# Patient Record
Sex: FEMALE | Race: WHITE
[De-identification: names, ages, dates, MRNs, and addresses within clinical notes are randomized per-mention and may not be internally consistent; named-entity substitution may affect disease eponyms.]

---

## 2017-10-25 ENCOUNTER — HOSPITAL ENCOUNTER (EMERGENCY)
Dept: HOSPITAL 17 - PHED | Age: 52
Discharge: HOME | End: 2017-10-25
Payer: SELF-PAY

## 2017-10-25 VITALS
RESPIRATION RATE: 18 BRPM | HEART RATE: 110 BPM | SYSTOLIC BLOOD PRESSURE: 163 MMHG | OXYGEN SATURATION: 90 % | DIASTOLIC BLOOD PRESSURE: 84 MMHG | TEMPERATURE: 98.5 F

## 2017-10-25 VITALS — WEIGHT: 120.81 LBS | BODY MASS INDEX: 25.36 KG/M2 | HEIGHT: 58 IN

## 2017-10-25 DIAGNOSIS — Z72.0: ICD-10-CM

## 2017-10-25 DIAGNOSIS — J44.1: Primary | ICD-10-CM

## 2017-10-25 DIAGNOSIS — R11.10: ICD-10-CM

## 2017-10-25 DIAGNOSIS — R19.7: ICD-10-CM

## 2017-10-25 LAB
ALBUMIN SERPL-MCNC: 3.6 GM/DL (ref 3.4–5)
ALP SERPL-CCNC: 124 U/L (ref 45–117)
ALT SERPL-CCNC: 123 U/L (ref 10–53)
AST SERPL-CCNC: 266 U/L (ref 15–37)
BACTERIA #/AREA URNS HPF: (no result) /HPF
BASOPHILS # BLD AUTO: 0.1 TH/MM3 (ref 0–0.2)
BASOPHILS NFR BLD: 2.2 % (ref 0–2)
BILIRUB SERPL-MCNC: 1.1 MG/DL (ref 0.2–1)
BUN SERPL-MCNC: 6 MG/DL (ref 7–18)
CALCIUM SERPL-MCNC: 8.6 MG/DL (ref 8.5–10.1)
CHLORIDE SERPL-SCNC: 97 MEQ/L (ref 98–107)
COLOR UR: YELLOW
CREAT SERPL-MCNC: 0.36 MG/DL (ref 0.5–1)
EOSINOPHIL # BLD: 0 TH/MM3 (ref 0–0.4)
EOSINOPHIL NFR BLD: 0.6 % (ref 0–4)
ERYTHROCYTE [DISTWIDTH] IN BLOOD BY AUTOMATED COUNT: 13.9 % (ref 11.6–17.2)
GFR SERPLBLD BASED ON 1.73 SQ M-ARVRAT: 189 ML/MIN (ref 89–?)
GLUCOSE SERPL-MCNC: 88 MG/DL (ref 74–106)
GLUCOSE UR STRIP-MCNC: (no result) MG/DL
HCO3 BLD-SCNC: 28.8 MEQ/L (ref 21–32)
HCT VFR BLD CALC: 42.7 % (ref 35–46)
HGB BLD-MCNC: 14.6 GM/DL (ref 11.6–15.3)
HGB UR QL STRIP: (no result)
KETONES UR STRIP-MCNC: 15 MG/DL
LEUKOCYTE ESTERASE UR QL STRIP: (no result) /HPF (ref 0–5)
LYMPHOCYTES # BLD AUTO: 1.7 TH/MM3 (ref 1–4.8)
LYMPHOCYTES NFR BLD AUTO: 27.3 % (ref 9–44)
MCH RBC QN AUTO: 34.5 PG (ref 27–34)
MCHC RBC AUTO-ENTMCNC: 34.2 % (ref 32–36)
MCV RBC AUTO: 101.1 FL (ref 80–100)
MONOCYTE #: 0.3 TH/MM3 (ref 0–0.9)
MONOCYTES NFR BLD: 4.3 % (ref 0–8)
MUCOUS THREADS #/AREA URNS LPF: (no result) /LPF
NEUTROPHILS # BLD AUTO: 4.1 TH/MM3 (ref 1.8–7.7)
NEUTROPHILS NFR BLD AUTO: 65.6 % (ref 16–70)
NITRITE UR QL STRIP: (no result)
PLATELET # BLD: 145 TH/MM3 (ref 150–450)
PMV BLD AUTO: 8.4 FL (ref 7–11)
PROT SERPL-MCNC: 6.7 GM/DL (ref 6.4–8.2)
RBC # BLD AUTO: 4.22 MIL/MM3 (ref 4–5.3)
RBC #/AREA URNS HPF: (no result) /HPF (ref 0–3)
SODIUM SERPL-SCNC: 135 MEQ/L (ref 136–145)
SP GR UR STRIP: 1.02 (ref 1–1.03)
SQUAMOUS #/AREA URNS HPF: (no result) /HPF (ref 0–5)
URINE LEUKOCYTE ESTERASE: (no result)
WBC # BLD AUTO: 6.2 TH/MM3 (ref 4–11)

## 2017-10-25 PROCEDURE — 80053 COMPREHEN METABOLIC PANEL: CPT

## 2017-10-25 PROCEDURE — 96361 HYDRATE IV INFUSION ADD-ON: CPT

## 2017-10-25 PROCEDURE — 96374 THER/PROPH/DIAG INJ IV PUSH: CPT

## 2017-10-25 PROCEDURE — 71020: CPT

## 2017-10-25 PROCEDURE — 85025 COMPLETE CBC W/AUTO DIFF WBC: CPT

## 2017-10-25 PROCEDURE — 99284 EMERGENCY DEPT VISIT MOD MDM: CPT

## 2017-10-25 PROCEDURE — 81001 URINALYSIS AUTO W/SCOPE: CPT

## 2017-10-25 PROCEDURE — 94664 DEMO&/EVAL PT USE INHALER: CPT

## 2017-10-25 NOTE — PD
Physical Exam


Date Seen by Provider:  Oct 25, 2017


Time Seen by Provider:  15:54


Narrative


The patient is a 52-year-old  female who was originally evaluated by 

the previous physician, Dr. Tobin.  Please refer to the initial history, 

physical, diagnostic evaluation, and treatment modality plan.  The patient was 

signed out at 4 PM which chest x-ray and laboratory evaluation pending for URI 

symptoms 2 weeks with productive cough and recent diarrhea.





Data


Data


Last Documented VS





Vital Signs








  Date Time  Temp Pulse Resp B/P (MAP) Pulse Ox O2 Delivery O2 Flow Rate FiO2


 


10/25/17 13:43 98.5 110 18 163/84 (110) 90   








Orders





 Orders


Urinalysis - C+S If Indicated (10/25/17 13:52)


Complete Blood Count With Diff (10/25/17 15:33)


Comprehensive Metabolic Panel (10/25/17 15:33)


Chest, Pa & Lat (10/25/17 15:33)


Albuterol-Ipratropium Neb (Duoneb Neb) (10/25/17 15:45)


Sodium Chlor 0.9% 1000 Ml Inj (Ns 1000 M (10/25/17 15:45)


Sodium Chlor 0.9% 1000 Ml Inj (Ns 1000 M (10/25/17 15:45)


Ondansetron Inj (Zofran Inj) (10/25/17 15:45)


Enteric Path (Stool) (10/25/17 15:33)


Prednisone (Deltasone) (10/25/17 17:30)





Labs





Laboratory Tests








Test


  10/25/17


14:23 10/25/17


15:48


 


Urine Collection Type CLEAN CATCH  


 


Urine Color YELLOW  


 


Urine Turbidity CLEAR  


 


Urine pH 7.5  


 


Urine Specific Gravity 1.023  


 


Urine Protein TRACE mg/dL  


 


Urine Glucose (UA) NEG mg/dL  


 


Urine Ketones 15 mg/dL  


 


Urine Occult Blood NEG  


 


Urine Nitrite NEG  


 


Urine Bilirubin NEG  


 


Urine Leukocyte Esterase NEG  


 


Urine RBC 0-3 /hpf  


 


Urine WBC 0-2 /hpf  


 


Urine Squamous Epithelial


Cells 6-8 /hpf 


  


 


 


Urine Bacteria OCC /hpf  


 


Urine Mucus MOD /lpf  


 


Microscopic Urinalysis Comment


  CULT NOT


INDICATED 


 


 


Urine Collection Time 14:23  


 


White Blood Count  6.2 TH/MM3 


 


Red Blood Count  4.22 MIL/MM3 


 


Hemoglobin  14.6 GM/DL 


 


Hematocrit  42.7 % 


 


Mean Corpuscular Volume  101.1 FL 


 


Mean Corpuscular Hemoglobin  34.5 PG 


 


Mean Corpuscular Hemoglobin


Concent 


  34.2 % 


 


 


Red Cell Distribution Width  13.9 % 


 


Platelet Count  145 TH/MM3 


 


Mean Platelet Volume  8.4 FL 


 


Neutrophils (%) (Auto)  65.6 % 


 


Lymphocytes (%) (Auto)  27.3 % 


 


Monocytes (%) (Auto)  4.3 % 


 


Eosinophils (%) (Auto)  0.6 % 


 


Basophils (%) (Auto)  2.2 % 


 


Neutrophils # (Auto)  4.1 TH/MM3 


 


Lymphocytes # (Auto)  1.7 TH/MM3 


 


Monocytes # (Auto)  0.3 TH/MM3 


 


Eosinophils # (Auto)  0.0 TH/MM3 


 


Basophils # (Auto)  0.1 TH/MM3 


 


CBC Comment  DIFF FINAL 


 


Differential Comment   


 


Blood Urea Nitrogen  6 MG/DL 


 


Creatinine  0.36 MG/DL 


 


Random Glucose  88 MG/DL 


 


Total Protein  6.7 GM/DL 


 


Albumin  3.6 GM/DL 


 


Calcium Level  8.6 MG/DL 


 


Alkaline Phosphatase  124 U/L 


 


Aspartate Amino Transf


(AST/SGOT) 


  266 U/L 


 


 


Alanine Aminotransferase


(ALT/SGPT) 


  123 U/L 


 


 


Total Bilirubin  1.1 MG/DL 


 


Sodium Level  135 MEQ/L 


 


Potassium Level  3.5 MEQ/L 


 


Chloride Level  97 MEQ/L 


 


Carbon Dioxide Level  28.8 MEQ/L 


 


Anion Gap  9 MEQ/L 


 


Estimat Glomerular Filtration


Rate 


  189 ML/MIN 


 











[facility]


Medical Record Reviewed:  Yes


Supervised Visit with SEAN:  No


Interpretation(s)





Laboratory Tests








Test


  10/25/17


14:23 10/25/17


15:48


 


Urine Collection Type CLEAN CATCH  


 


Urine Color YELLOW  


 


Urine Turbidity CLEAR  


 


Urine pH 7.5  


 


Urine Specific Gravity 1.023  


 


Urine Protein TRACE mg/dL  


 


Urine Glucose (UA) NEG mg/dL  


 


Urine Ketones 15 mg/dL  


 


Urine Occult Blood NEG  


 


Urine Nitrite NEG  


 


Urine Bilirubin NEG  


 


Urine Leukocyte Esterase NEG  


 


Urine RBC 0-3 /hpf  


 


Urine WBC 0-2 /hpf  


 


Urine Squamous Epithelial


Cells 6-8 /hpf 


  


 


 


Urine Bacteria OCC /hpf  


 


Urine Mucus MOD /lpf  


 


Microscopic Urinalysis Comment


  CULT NOT


INDICATED 


 


 


Urine Collection Time 14:23  


 


White Blood Count  6.2 TH/MM3 


 


Red Blood Count  4.22 MIL/MM3 


 


Hemoglobin  14.6 GM/DL 


 


Hematocrit  42.7 % 


 


Mean Corpuscular Volume  101.1 FL 


 


Mean Corpuscular Hemoglobin  34.5 PG 


 


Mean Corpuscular Hemoglobin


Concent 


  34.2 % 


 


 


Red Cell Distribution Width  13.9 % 


 


Platelet Count  145 TH/MM3 


 


Mean Platelet Volume  8.4 FL 


 


Neutrophils (%) (Auto)  65.6 % 


 


Lymphocytes (%) (Auto)  27.3 % 


 


Monocytes (%) (Auto)  4.3 % 


 


Eosinophils (%) (Auto)  0.6 % 


 


Basophils (%) (Auto)  2.2 % 


 


Neutrophils # (Auto)  4.1 TH/MM3 


 


Lymphocytes # (Auto)  1.7 TH/MM3 


 


Monocytes # (Auto)  0.3 TH/MM3 


 


Eosinophils # (Auto)  0.0 TH/MM3 


 


Basophils # (Auto)  0.1 TH/MM3 


 


CBC Comment  DIFF FINAL 


 


Differential Comment   


 


Blood Urea Nitrogen  6 MG/DL 


 


Creatinine  0.36 MG/DL 


 


Random Glucose  88 MG/DL 


 


Total Protein  6.7 GM/DL 


 


Albumin  3.6 GM/DL 


 


Calcium Level  8.6 MG/DL 


 


Alkaline Phosphatase  124 U/L 


 


Aspartate Amino Transf


(AST/SGOT) 


  266 U/L 


 


 


Alanine Aminotransferase


(ALT/SGPT) 


  123 U/L 


 


 


Total Bilirubin  1.1 MG/DL 


 


Sodium Level  135 MEQ/L 


 


Potassium Level  3.5 MEQ/L 


 


Chloride Level  97 MEQ/L 


 


Carbon Dioxide Level  28.8 MEQ/L 


 


Anion Gap  9 MEQ/L 


 


Estimat Glomerular Filtration


Rate 


  189 ML/MIN 


 








Differential Diagnosis


Differential diagnosis includes pneumonia, viral syndrome, influenza, colitis, 

enteritis, dehydration, COPD exacerbation.


Narrative Course


The patient was initially evaluated by the previous physician, Dr. Tobin.  

Please refer to the initial history, physical, diagnostic evaluation, and 

treatment modality plan.  The patient was signed out of 4 PM a chest x-ray and 

laboratory evaluation pending.  UA was unremarkable.  The patient's white count 

is unremarkable.  AST and ALT are elevated, AST greater than ALT.  MCV is 

slightly elevated at 101.  Chest x-rays unremarkable.  I had a discussion with 

the patient at 5:10 PM she was reevaluated, her symptoms had significantly 

improved.  O2 sat was 94% on room air.  The patient appears to have bronchitis 

and underlying viral syndrome.  The patient is taking over-the-counter Imodium 

and drinking plenty of fluids without any vomiting.  She'll be sent home on 

prednisone, albuterol nebulizers, and Zithromax.  She is advised to follow-up 

with her primary physician, Dr. Greenberg, and will be provided a copy of her x-

ray results and lab results at discharge.  She is advised to return if symptoms 

worsen or progress.


Diagnosis





 Primary Impression:  


 COPD with acute exacerbation


 Additional Impression:  


 Diarrhea


 Qualified Codes:  R19.7 - Diarrhea, unspecified


Patient Instructions:  General Instructions





***Additional Instruction:  


Medications as directed.  Return if symptoms worsen or progress.  Please 

provide the patient a copy of her x-ray results and lab results at discharge.  

Follow-up with Dr. Greenberg.


***Med/Other Pt SpecificInfo:  Prescription(s) given


Scripts


Albuterol Neb (Albuterol Neb) 2.5 Mg/3 Ml Neb


2.5 MG NEB Q4HR NEB Y for SHORTNESS OF BREATH, #60 NEBULE 0 Refills


   Prov: Kirill Dillard MD         10/25/17 


Azithromycin (Zithromax Z-Angelito) 250 Mg Dspk


250 MG PO AS DIRECTED for Infection, #1 DSPK 0 Refills


   500 MG (2 tabs) day 1, then 1 tab days 2-5.


   Prov: Kirill Dillard MD         10/25/17 


Prednisone (Deltasone) 20 Mg Tab


40 MG PO DAILY for 4 Days, #8 TAB 0 Refills


   Prov: Kirill Dillard MD         10/25/17


Disposition:  01 DISCHARGE HOME


Condition:  Stable











Kirill Dillard MD Oct 25, 2017 15:55

## 2017-10-25 NOTE — PD
HPI


Chief Complaint:  General Weakness


Time Seen by Provider:  15:17


Travel History


International Travel<30 days:  No


Contact w/Intl Traveler<30days:  No


Traveled to known affect area:  No





History of Present Illness


HPI


This 52-year-old female is complaining of cough and congestion.  She's been 

sick for a couple of weeks.  The last few days been having a lot of vomiting 

and diarrhea.  She started with cough and congestion.  She was here in 2016 with pneumonia.  She does smoke cigarettes.  She's been coughing up a lot 

of phlegm.  She started with diarrhea the last few days.  She estimates she has 

had a episodes of diarrhea today.  She has not been on any antibiotics.  She 

has been using over-the-counter medications without any response.  She says she 

feels like she did when she had pneumonia





PFSH


Past Medical History


Autoimmune Disease:  No


Cancer:  No


Cardiovascular Problems:  Yes


High Cholesterol:  Yes


Chemotherapy:  No


Diabetes:  No


Diminished Hearing:  No


Endocrine:  No


Gastrointestinal Disorders:  Yes


GERD:  No


Genitourinary:  No


Hiatal Hernia:  No


Immune Disorder:  No


Musculoskeletal:  No


Neurologic:  No


Psychiatric:  No


Reproductive:  No


Respiratory:  Yes


Radiation Therapy:  No


Sickle Cell Disease:  No


Thyroid Disease:  No


Pregnant?:  Not Pregnant


Menopausal:  Yes


:  2


Para:  2





Past Surgical History


Abdominal Surgery:  Yes (Hernia repair)


Cardiac Surgery:  No


 Section:  Yes (X's 2)


Cholecystectomy:  Yes


Ear Surgery:  No


Endocrine Surgery:  No


Eye Surgery:  No


Genitourinary Surgery:  No


Gynecologic Surgery:  Yes (2 c-sections)


Neurologic Surgery:  Yes (Sinus)


Oral Surgery:  No


Pacemaker:  No


Thoracic Surgery:  No


Tonsillectomy:  Yes


Other Surgery:  Yes





Social History


Alcohol Use:  Yes (Soc.)


Tobacco Use:  Yes (1 PPD)


Substance Use:  No





Allergies-Medications


(Allergen,Severity, Reaction):  


Coded Allergies:  


     codeine (Unverified  Allergy, Severe, INCREASED LIVER ENZYMES, 10/25/17)


     meperidine (Unverified  Allergy, Severe, UNKNOWN, 10/25/17)


     acetaminophen (Unverified  Adverse Reaction, Severe, LIVER PROBLEMS, 10/25/

17)


     morphine (Unverified  Adverse Reaction, Severe, LIVER PROBLEMS, 10/25/17)


Reported Meds & Prescriptions





Reported Meds & Active Scripts


Active


Reported


Ambien (Zolpidem Tartrate) 5 Mg Tab 5 Mg PO HS PRN








Review of Systems


General / Constitutional:  Positive: Fever, Chills


Eyes:  No: Diploplia, Blurred Vision


HENT:  No: Headaches, Vertigo


Cardiovascular:  No: Chest Pain or Discomfort, Palpitations


Respiratory:  No: Cough, Shortness of Breath


Gastrointestinal:  No: Nausea, Vomiting


Genitourinary:  No: Urgency, Frequency


Musculoskeletal:  No: Myalgias, Arthralgias


Skin:  No Rash, No Itching


Neurologic:  No: Weakness, Dizziness


Endocrine:  No: Heat Intolerance, Cold Intolerance


Hematologic/Lymphatic:  No: Easy Bruising





Physical Exam


Narrative


GENERAL: Well-developed female


SKIN: Focused skin assessment warm/dry.


HEAD: Atraumatic. Normocephalic. 


EYES: Pupils equal and round. No scleral icterus. No injection or drainage. 


ENT: No nasal bleeding or discharge.  Mucous membranes pink and moist.


NECK: Trachea midline. No JVD. 


CARDIOVASCULAR: Regular rate and rhythm.  No murmur appreciated.


RESPIRATORY: No accessory muscle use. Clear to auscultation. Breath sounds 

equal bilaterally. 


GASTROINTESTINAL: Abdomen soft, non-tender, nondistended. Hepatic and splenic 

margins not palpable. 


MUSCULOSKELETAL: No obvious deformities. No clubbing.  No cyanosis.  No edema. 


NEUROLOGICAL: Awake and alert. No obvious cranial nerve deficits.  Motor 

grossly within normal limits. Normal speech.


PSYCHIATRIC: Appropriate mood and affect; insight and judgment normal.Physical





Data


Data


Last Documented VS





Vital Signs








  Date Time  Temp Pulse Resp B/P (MAP) Pulse Ox O2 Delivery O2 Flow Rate FiO2


 


10/25/17 13:43 98.5 110 18 163/84 (110) 90   








Orders





 Orders


Urinalysis - C+S If Indicated (10/25/17 13:52)


Complete Blood Count With Diff (10/25/17 15:33)


Comprehensive Metabolic Panel (10/25/17 15:33)


Chest, Pa & Lat (10/25/17 15:33)


Albuterol-Ipratropium Neb (Duoneb Neb) (10/25/17 15:45)


Sodium Chlor 0.9% 1000 Ml Inj (Ns 1000 M (10/25/17 15:45)


Sodium Chlor 0.9% 1000 Ml Inj (Ns 1000 M (10/25/17 15:45)


Ondansetron Inj (Zofran Inj) (10/25/17 15:45)


Enteric Path (Stool) (10/25/17 15:33)





Labs





Laboratory Tests








Test


  10/25/17


14:23 10/25/17


15:48


 


Urine Collection Type CLEAN CATCH  


 


Urine Color YELLOW  


 


Urine Turbidity CLEAR  


 


Urine pH 7.5  


 


Urine Specific Gravity 1.023  


 


Urine Protein TRACE mg/dL  


 


Urine Glucose (UA) NEG mg/dL  


 


Urine Ketones 15 mg/dL  


 


Urine Occult Blood NEG  


 


Urine Nitrite NEG  


 


Urine Bilirubin NEG  


 


Urine Leukocyte Esterase NEG  


 


Urine RBC 0-3 /hpf  


 


Urine WBC 0-2 /hpf  


 


Urine Squamous Epithelial


Cells 6-8 /hpf 


  


 


 


Urine Bacteria OCC /hpf  


 


Urine Mucus MOD /lpf  


 


Microscopic Urinalysis Comment


  CULT NOT


INDICATED 


 


 


Urine Collection Time 14:23  


 


White Blood Count  6.2 TH/MM3 


 


Red Blood Count  4.22 MIL/MM3 


 


Hemoglobin  14.6 GM/DL 


 


Hematocrit  42.7 % 


 


Mean Corpuscular Volume  101.1 FL 


 


Mean Corpuscular Hemoglobin  34.5 PG 


 


Mean Corpuscular Hemoglobin


Concent 


  34.2 % 


 


 


Red Cell Distribution Width  13.9 % 


 


Platelet Count  145 TH/MM3 


 


Mean Platelet Volume  8.4 FL 


 


Neutrophils (%) (Auto)  65.6 % 


 


Lymphocytes (%) (Auto)  27.3 % 


 


Monocytes (%) (Auto)  4.3 % 


 


Eosinophils (%) (Auto)  0.6 % 


 


Basophils (%) (Auto)  2.2 % 


 


Neutrophils # (Auto)  4.1 TH/MM3 


 


Lymphocytes # (Auto)  1.7 TH/MM3 


 


Monocytes # (Auto)  0.3 TH/MM3 


 


Eosinophils # (Auto)  0.0 TH/MM3 


 


Basophils # (Auto)  0.1 TH/MM3 


 


CBC Comment  DIFF FINAL 


 


Differential Comment   


 


Sodium Level  135 MEQ/L 


 


Potassium Level  3.5 MEQ/L 


 


Chloride Level  97 MEQ/L 











[facility]


Medical Decision Making


Medical Screen Exam Complete:  Yes


Emergency Medical Condition:  Yes


Medical Record Reviewed:  Yes


Differential Diagnosis


Differential includes pneumonia, COPD exacerbation, bronchitis, gastroenteritis


Narrative Course


Lab work and x-ray have been ordered and will be evaluated by oncoming physician











Tigre Gallagher MD Oct 25, 2017 15:44

## 2017-10-25 NOTE — PD
HPI


Chief Complaint:  General Weakness


Time Seen by Provider:  15:17


Travel History


International Travel<30 days:  No


Contact w/Intl Traveler<30days:  No


Traveled to known affect area:  No





History of Present Illness


HPI


This 52-year-old female is complaining of cough and congestion.  She's been 

sick for a couple of weeks.  The last few days been having a lot of vomiting 

and diarrhea.  She started with cough and congestion.  She was here in 2016 with pneumonia.  She does smoke cigarettes.  She's been coughing up a lot 

of phlegm.  She started with diarrhea the last few days.  She estimates she has 

had a episodes of diarrhea today.  She has not been on any antibiotics.  She 

has been using over-the-counter medications without any response.  She says she 

feels like she did when she had pneumonia





PFSH


Past Medical History


Autoimmune Disease:  No


Cancer:  No


Cardiovascular Problems:  Yes


High Cholesterol:  Yes


Chemotherapy:  No


Diabetes:  No


Diminished Hearing:  No


Endocrine:  No


Gastrointestinal Disorders:  Yes


GERD:  No


Genitourinary:  No


Hiatal Hernia:  No


Immune Disorder:  No


Musculoskeletal:  No


Neurologic:  No


Psychiatric:  No


Reproductive:  No


Respiratory:  Yes


Radiation Therapy:  No


Sickle Cell Disease:  No


Thyroid Disease:  No


Pregnant?:  Not Pregnant


Menopausal:  Yes


:  2


Para:  2





Past Surgical History


Abdominal Surgery:  Yes (Hernia repair)


Cardiac Surgery:  No


 Section:  Yes (X's 2)


Cholecystectomy:  Yes


Ear Surgery:  No


Endocrine Surgery:  No


Eye Surgery:  No


Genitourinary Surgery:  No


Gynecologic Surgery:  Yes (2 c-sections)


Neurologic Surgery:  Yes (Sinus)


Oral Surgery:  No


Pacemaker:  No


Thoracic Surgery:  No


Tonsillectomy:  Yes


Other Surgery:  Yes





Social History


Alcohol Use:  Yes (Soc.)


Tobacco Use:  Yes (1 PPD)


Substance Use:  No





Allergies-Medications


(Allergen,Severity, Reaction):  


Coded Allergies:  


     codeine (Unverified  Allergy, Severe, INCREASED LIVER ENZYMES, 10/25/17)


     meperidine (Unverified  Allergy, Severe, UNKNOWN, 10/25/17)


     acetaminophen (Unverified  Adverse Reaction, Severe, LIVER PROBLEMS, 10/25/

17)


     morphine (Unverified  Adverse Reaction, Severe, LIVER PROBLEMS, 10/25/17)


Reported Meds & Prescriptions





Reported Meds & Active Scripts


Active


Reported


Ambien (Zolpidem Tartrate) 5 Mg Tab 5 Mg PO HS PRN








Review of Systems


General / Constitutional:  Positive: Fever, Chills


Eyes:  No: Diploplia, Blurred Vision


HENT:  No: Headaches, Vertigo


Cardiovascular:  No: Chest Pain or Discomfort, Palpitations


Respiratory:  No: Cough, Shortness of Breath


Gastrointestinal:  No: Nausea, Vomiting


Genitourinary:  No: Urgency, Frequency


Musculoskeletal:  No: Myalgias, Arthralgias


Skin:  No Rash, No Itching


Neurologic:  No: Weakness, Dizziness


Endocrine:  No: Heat Intolerance, Cold Intolerance


Hematologic/Lymphatic:  No: Easy Bruising





Physical Exam


Narrative


GENERAL: Well-developed female


SKIN: Focused skin assessment warm/dry.


HEAD: Atraumatic. Normocephalic. 


EYES: Pupils equal and round. No scleral icterus. No injection or drainage. 


ENT: No nasal bleeding or discharge.  Mucous membranes pink and moist.


NECK: Trachea midline. No JVD. 


CARDIOVASCULAR: Regular rate and rhythm.  No murmur appreciated.


RESPIRATORY: No accessory muscle use. Clear to auscultation. Breath sounds 

equal bilaterally. 


GASTROINTESTINAL: Abdomen soft, non-tender, nondistended. Hepatic and splenic 

margins not palpable. 


MUSCULOSKELETAL: No obvious deformities. No clubbing.  No cyanosis.  No edema. 


NEUROLOGICAL: Awake and alert. No obvious cranial nerve deficits.  Motor 

grossly within normal limits. Normal speech.


PSYCHIATRIC: Appropriate mood and affect; insight and judgment normal.Physical





Data


Data


Last Documented VS





Vital Signs








  Date Time  Temp Pulse Resp B/P (MAP) Pulse Ox O2 Delivery O2 Flow Rate FiO2


 


10/25/17 13:43 98.5 110 18 163/84 (110) 90   








Orders





 Orders


Urinalysis - C+S If Indicated (10/25/17 13:52)


Complete Blood Count With Diff (10/25/17 15:33)


Comprehensive Metabolic Panel (10/25/17 15:33)


Chest, Pa & Lat (10/25/17 15:33)


Albuterol-Ipratropium Neb (Duoneb Neb) (10/25/17 15:45)


Sodium Chlor 0.9% 1000 Ml Inj (Ns 1000 M (10/25/17 15:45)


Sodium Chlor 0.9% 1000 Ml Inj (Ns 1000 M (10/25/17 15:45)


Ondansetron Inj (Zofran Inj) (10/25/17 15:45)


Enteric Path (Stool) (10/25/17 15:33)





Labs





Laboratory Tests








Test


  10/25/17


14:23 10/25/17


15:48


 


Urine Collection Type CLEAN CATCH  


 


Urine Color YELLOW  


 


Urine Turbidity CLEAR  


 


Urine pH 7.5  


 


Urine Specific Gravity 1.023  


 


Urine Protein TRACE mg/dL  


 


Urine Glucose (UA) NEG mg/dL  


 


Urine Ketones 15 mg/dL  


 


Urine Occult Blood NEG  


 


Urine Nitrite NEG  


 


Urine Bilirubin NEG  


 


Urine Leukocyte Esterase NEG  


 


Urine RBC 0-3 /hpf  


 


Urine WBC 0-2 /hpf  


 


Urine Squamous Epithelial


Cells 6-8 /hpf 


  


 


 


Urine Bacteria OCC /hpf  


 


Urine Mucus MOD /lpf  


 


Microscopic Urinalysis Comment


  CULT NOT


INDICATED 


 


 


Urine Collection Time 14:23  


 


White Blood Count  6.2 TH/MM3 


 


Red Blood Count  4.22 MIL/MM3 


 


Hemoglobin  14.6 GM/DL 


 


Hematocrit  42.7 % 


 


Mean Corpuscular Volume  101.1 FL 


 


Mean Corpuscular Hemoglobin  34.5 PG 


 


Mean Corpuscular Hemoglobin


Concent 


  34.2 % 


 


 


Red Cell Distribution Width  13.9 % 


 


Platelet Count  145 TH/MM3 


 


Mean Platelet Volume  8.4 FL 


 


Neutrophils (%) (Auto)  65.6 % 


 


Lymphocytes (%) (Auto)  27.3 % 


 


Monocytes (%) (Auto)  4.3 % 


 


Eosinophils (%) (Auto)  0.6 % 


 


Basophils (%) (Auto)  2.2 % 


 


Neutrophils # (Auto)  4.1 TH/MM3 


 


Lymphocytes # (Auto)  1.7 TH/MM3 


 


Monocytes # (Auto)  0.3 TH/MM3 


 


Eosinophils # (Auto)  0.0 TH/MM3 


 


Basophils # (Auto)  0.1 TH/MM3 


 


CBC Comment  DIFF FINAL 


 


Differential Comment   


 


Sodium Level  135 MEQ/L 


 


Potassium Level  3.5 MEQ/L 


 


Chloride Level  97 MEQ/L 











[facility]


Medical Decision Making


Medical Screen Exam Complete:  Yes


Emergency Medical Condition:  Yes


Medical Record Reviewed:  Yes


Differential Diagnosis


Differential includes pneumonia, COPD exacerbation, bronchitis, gastroenteritis


Narrative Course


Lab work and x-ray have been ordered and will be evaluated by oncoming physician











Tigre Gallagher MD Oct 25, 2017 15:44

## 2017-10-25 NOTE — PD
Physical Exam


Date Seen by Provider:  Oct 25, 2017


Time Seen by Provider:  15:54


Narrative


The patient is a 52-year-old  female who was originally evaluated by 

the previous physician, Dr. Tobin.  Please refer to the initial history, 

physical, diagnostic evaluation, and treatment modality plan.  The patient was 

signed out at 4 PM which chest x-ray and laboratory evaluation pending for URI 

symptoms 2 weeks with productive cough and recent diarrhea.





Data


Data


Last Documented VS





Vital Signs








  Date Time  Temp Pulse Resp B/P (MAP) Pulse Ox O2 Delivery O2 Flow Rate FiO2


 


10/25/17 13:43 98.5 110 18 163/84 (110) 90   








Orders





 Orders


Urinalysis - C+S If Indicated (10/25/17 13:52)


Complete Blood Count With Diff (10/25/17 15:33)


Comprehensive Metabolic Panel (10/25/17 15:33)


Chest, Pa & Lat (10/25/17 15:33)


Albuterol-Ipratropium Neb (Duoneb Neb) (10/25/17 15:45)


Sodium Chlor 0.9% 1000 Ml Inj (Ns 1000 M (10/25/17 15:45)


Sodium Chlor 0.9% 1000 Ml Inj (Ns 1000 M (10/25/17 15:45)


Ondansetron Inj (Zofran Inj) (10/25/17 15:45)


Enteric Path (Stool) (10/25/17 15:33)


Prednisone (Deltasone) (10/25/17 17:30)





Labs





Laboratory Tests








Test


  10/25/17


14:23 10/25/17


15:48


 


Urine Collection Type CLEAN CATCH  


 


Urine Color YELLOW  


 


Urine Turbidity CLEAR  


 


Urine pH 7.5  


 


Urine Specific Gravity 1.023  


 


Urine Protein TRACE mg/dL  


 


Urine Glucose (UA) NEG mg/dL  


 


Urine Ketones 15 mg/dL  


 


Urine Occult Blood NEG  


 


Urine Nitrite NEG  


 


Urine Bilirubin NEG  


 


Urine Leukocyte Esterase NEG  


 


Urine RBC 0-3 /hpf  


 


Urine WBC 0-2 /hpf  


 


Urine Squamous Epithelial


Cells 6-8 /hpf 


  


 


 


Urine Bacteria OCC /hpf  


 


Urine Mucus MOD /lpf  


 


Microscopic Urinalysis Comment


  CULT NOT


INDICATED 


 


 


Urine Collection Time 14:23  


 


White Blood Count  6.2 TH/MM3 


 


Red Blood Count  4.22 MIL/MM3 


 


Hemoglobin  14.6 GM/DL 


 


Hematocrit  42.7 % 


 


Mean Corpuscular Volume  101.1 FL 


 


Mean Corpuscular Hemoglobin  34.5 PG 


 


Mean Corpuscular Hemoglobin


Concent 


  34.2 % 


 


 


Red Cell Distribution Width  13.9 % 


 


Platelet Count  145 TH/MM3 


 


Mean Platelet Volume  8.4 FL 


 


Neutrophils (%) (Auto)  65.6 % 


 


Lymphocytes (%) (Auto)  27.3 % 


 


Monocytes (%) (Auto)  4.3 % 


 


Eosinophils (%) (Auto)  0.6 % 


 


Basophils (%) (Auto)  2.2 % 


 


Neutrophils # (Auto)  4.1 TH/MM3 


 


Lymphocytes # (Auto)  1.7 TH/MM3 


 


Monocytes # (Auto)  0.3 TH/MM3 


 


Eosinophils # (Auto)  0.0 TH/MM3 


 


Basophils # (Auto)  0.1 TH/MM3 


 


CBC Comment  DIFF FINAL 


 


Differential Comment   


 


Blood Urea Nitrogen  6 MG/DL 


 


Creatinine  0.36 MG/DL 


 


Random Glucose  88 MG/DL 


 


Total Protein  6.7 GM/DL 


 


Albumin  3.6 GM/DL 


 


Calcium Level  8.6 MG/DL 


 


Alkaline Phosphatase  124 U/L 


 


Aspartate Amino Transf


(AST/SGOT) 


  266 U/L 


 


 


Alanine Aminotransferase


(ALT/SGPT) 


  123 U/L 


 


 


Total Bilirubin  1.1 MG/DL 


 


Sodium Level  135 MEQ/L 


 


Potassium Level  3.5 MEQ/L 


 


Chloride Level  97 MEQ/L 


 


Carbon Dioxide Level  28.8 MEQ/L 


 


Anion Gap  9 MEQ/L 


 


Estimat Glomerular Filtration


Rate 


  189 ML/MIN 


 











[facility]


Medical Record Reviewed:  Yes


Supervised Visit with SEAN:  No


Interpretation(s)





Laboratory Tests








Test


  10/25/17


14:23 10/25/17


15:48


 


Urine Collection Type CLEAN CATCH  


 


Urine Color YELLOW  


 


Urine Turbidity CLEAR  


 


Urine pH 7.5  


 


Urine Specific Gravity 1.023  


 


Urine Protein TRACE mg/dL  


 


Urine Glucose (UA) NEG mg/dL  


 


Urine Ketones 15 mg/dL  


 


Urine Occult Blood NEG  


 


Urine Nitrite NEG  


 


Urine Bilirubin NEG  


 


Urine Leukocyte Esterase NEG  


 


Urine RBC 0-3 /hpf  


 


Urine WBC 0-2 /hpf  


 


Urine Squamous Epithelial


Cells 6-8 /hpf 


  


 


 


Urine Bacteria OCC /hpf  


 


Urine Mucus MOD /lpf  


 


Microscopic Urinalysis Comment


  CULT NOT


INDICATED 


 


 


Urine Collection Time 14:23  


 


White Blood Count  6.2 TH/MM3 


 


Red Blood Count  4.22 MIL/MM3 


 


Hemoglobin  14.6 GM/DL 


 


Hematocrit  42.7 % 


 


Mean Corpuscular Volume  101.1 FL 


 


Mean Corpuscular Hemoglobin  34.5 PG 


 


Mean Corpuscular Hemoglobin


Concent 


  34.2 % 


 


 


Red Cell Distribution Width  13.9 % 


 


Platelet Count  145 TH/MM3 


 


Mean Platelet Volume  8.4 FL 


 


Neutrophils (%) (Auto)  65.6 % 


 


Lymphocytes (%) (Auto)  27.3 % 


 


Monocytes (%) (Auto)  4.3 % 


 


Eosinophils (%) (Auto)  0.6 % 


 


Basophils (%) (Auto)  2.2 % 


 


Neutrophils # (Auto)  4.1 TH/MM3 


 


Lymphocytes # (Auto)  1.7 TH/MM3 


 


Monocytes # (Auto)  0.3 TH/MM3 


 


Eosinophils # (Auto)  0.0 TH/MM3 


 


Basophils # (Auto)  0.1 TH/MM3 


 


CBC Comment  DIFF FINAL 


 


Differential Comment   


 


Blood Urea Nitrogen  6 MG/DL 


 


Creatinine  0.36 MG/DL 


 


Random Glucose  88 MG/DL 


 


Total Protein  6.7 GM/DL 


 


Albumin  3.6 GM/DL 


 


Calcium Level  8.6 MG/DL 


 


Alkaline Phosphatase  124 U/L 


 


Aspartate Amino Transf


(AST/SGOT) 


  266 U/L 


 


 


Alanine Aminotransferase


(ALT/SGPT) 


  123 U/L 


 


 


Total Bilirubin  1.1 MG/DL 


 


Sodium Level  135 MEQ/L 


 


Potassium Level  3.5 MEQ/L 


 


Chloride Level  97 MEQ/L 


 


Carbon Dioxide Level  28.8 MEQ/L 


 


Anion Gap  9 MEQ/L 


 


Estimat Glomerular Filtration


Rate 


  189 ML/MIN 


 








Differential Diagnosis


Differential diagnosis includes pneumonia, viral syndrome, influenza, colitis, 

enteritis, dehydration, COPD exacerbation.


Narrative Course


The patient was initially evaluated by the previous physician, Dr. Tobin.  

Please refer to the initial history, physical, diagnostic evaluation, and 

treatment modality plan.  The patient was signed out of 4 PM a chest x-ray and 

laboratory evaluation pending.  UA was unremarkable.  The patient's white count 

is unremarkable.  AST and ALT are elevated, AST greater than ALT.  MCV is 

slightly elevated at 101.  Chest x-rays unremarkable.  I had a discussion with 

the patient at 5:10 PM she was reevaluated, her symptoms had significantly 

improved.  O2 sat was 94% on room air.  The patient appears to have bronchitis 

and underlying viral syndrome.  The patient is taking over-the-counter Imodium 

and drinking plenty of fluids without any vomiting.  She'll be sent home on 

prednisone, albuterol nebulizers, and Zithromax.  She is advised to follow-up 

with her primary physician, Dr. Greenberg, and will be provided a copy of her x-

ray results and lab results at discharge.  She is advised to return if symptoms 

worsen or progress.


Diagnosis





 Primary Impression:  


 COPD with acute exacerbation


 Additional Impression:  


 Diarrhea


 Qualified Codes:  R19.7 - Diarrhea, unspecified


Patient Instructions:  General Instructions





***Additional Instruction:  


Medications as directed.  Return if symptoms worsen or progress.  Please 

provide the patient a copy of her x-ray results and lab results at discharge.  

Follow-up with Dr. Greenberg.


***Med/Other Pt SpecificInfo:  Prescription(s) given


Scripts


Albuterol Neb (Albuterol Neb) 2.5 Mg/3 Ml Neb


2.5 MG NEB Q4HR NEB Y for SHORTNESS OF BREATH, #60 NEBULE 0 Refills


   Prov: Kirill Dillard MD         10/25/17 


Azithromycin (Zithromax Z-Angelito) 250 Mg Dspk


250 MG PO AS DIRECTED for Infection, #1 DSPK 0 Refills


   500 MG (2 tabs) day 1, then 1 tab days 2-5.


   Prov: Kirill Dillard MD         10/25/17 


Prednisone (Deltasone) 20 Mg Tab


40 MG PO DAILY for 4 Days, #8 TAB 0 Refills


   Prov: Kirill Dillard MD         10/25/17


Disposition:  01 DISCHARGE HOME


Condition:  Stable











Kirill Dillard MD Oct 25, 2017 15:55

## 2017-10-25 NOTE — RADRPT
EXAM DATE/TIME:  10/25/2017 15:46 

 

[facility] COMPARISON:     

No previous studies available for comparison.

 

                     

INDICATIONS :     

Short of breath and cough.

                     

 

MEDICAL HISTORY :     

None.          

 

SURGICAL HISTORY :     

None.   

 

ENCOUNTER:     

Initial                                        

 

ACUITY:     

2 weeks      

 

PAIN SCORE:     

6/10

 

LOCATION:     

Bilateral chest 

 

FINDINGS:     

PA and lateral views of the chest demonstrate the lungs to be symmetrically aerated without evidence 
of mass, infiltrate or effusion.  The cardiomediastinal contours are unremarkable.  Mild S-shaped sco
liosis.  Hemoclips in the right upper quadrant..

 

CONCLUSION:     

No acute cardiopulmonary disease.

 

 

 

 Edmundo Nielsen MD on October 25, 2017 at 16:19           

Board Certified Radiologist.

 This report was verified electronically.

## 2017-10-25 NOTE — PD
HPI


Chief Complaint:  General Weakness


Time Seen by Provider:  15:17


Travel History


International Travel<30 days:  No


Contact w/Intl Traveler<30days:  No


Traveled to known affect area:  No





History of Present Illness


HPI


This 52-year-old female is complaining of cough and congestion.  She's been 

sick for a couple of weeks.  The last few days been having a lot of vomiting 

and diarrhea.  She started with cough and congestion.  She was here in 2016 with pneumonia.  She does smoke cigarettes.  She's been coughing up a lot 

of phlegm.  She started with diarrhea the last few days.  She estimates she has 

had a episodes of diarrhea today.  She has not been on any antibiotics.  She 

has been using over-the-counter medications without any response.  She says she 

feels like she did when she had pneumonia





PFSH


Past Medical History


Autoimmune Disease:  No


Cancer:  No


Cardiovascular Problems:  Yes


High Cholesterol:  Yes


Chemotherapy:  No


Diabetes:  No


Diminished Hearing:  No


Endocrine:  No


Gastrointestinal Disorders:  Yes


GERD:  No


Genitourinary:  No


Hiatal Hernia:  No


Immune Disorder:  No


Musculoskeletal:  No


Neurologic:  No


Psychiatric:  No


Reproductive:  No


Respiratory:  Yes


Radiation Therapy:  No


Sickle Cell Disease:  No


Thyroid Disease:  No


Pregnant?:  Not Pregnant


Menopausal:  Yes


:  2


Para:  2





Past Surgical History


Abdominal Surgery:  Yes (Hernia repair)


Cardiac Surgery:  No


 Section:  Yes (X's 2)


Cholecystectomy:  Yes


Ear Surgery:  No


Endocrine Surgery:  No


Eye Surgery:  No


Genitourinary Surgery:  No


Gynecologic Surgery:  Yes (2 c-sections)


Neurologic Surgery:  Yes (Sinus)


Oral Surgery:  No


Pacemaker:  No


Thoracic Surgery:  No


Tonsillectomy:  Yes


Other Surgery:  Yes





Social History


Alcohol Use:  Yes (Soc.)


Tobacco Use:  Yes (1 PPD)


Substance Use:  No





Allergies-Medications


(Allergen,Severity, Reaction):  


Coded Allergies:  


     codeine (Unverified  Allergy, Severe, INCREASED LIVER ENZYMES, 10/25/17)


     meperidine (Unverified  Allergy, Severe, UNKNOWN, 10/25/17)


     acetaminophen (Unverified  Adverse Reaction, Severe, LIVER PROBLEMS, 10/25/

17)


     morphine (Unverified  Adverse Reaction, Severe, LIVER PROBLEMS, 10/25/17)


Reported Meds & Prescriptions





Reported Meds & Active Scripts


Active


Reported


Ambien (Zolpidem Tartrate) 5 Mg Tab 5 Mg PO HS PRN








Review of Systems


General / Constitutional:  Positive: Fever, Chills


Eyes:  No: Diploplia, Blurred Vision


HENT:  No: Headaches, Vertigo


Cardiovascular:  No: Chest Pain or Discomfort, Palpitations


Respiratory:  No: Cough, Shortness of Breath


Gastrointestinal:  No: Nausea, Vomiting


Genitourinary:  No: Urgency, Frequency


Musculoskeletal:  No: Myalgias, Arthralgias


Skin:  No Rash, No Itching


Neurologic:  No: Weakness, Dizziness


Endocrine:  No: Heat Intolerance, Cold Intolerance


Hematologic/Lymphatic:  No: Easy Bruising





Physical Exam


Narrative


GENERAL: Well-developed female


SKIN: Focused skin assessment warm/dry.


HEAD: Atraumatic. Normocephalic. 


EYES: Pupils equal and round. No scleral icterus. No injection or drainage. 


ENT: No nasal bleeding or discharge.  Mucous membranes pink and moist.


NECK: Trachea midline. No JVD. 


CARDIOVASCULAR: Regular rate and rhythm.  No murmur appreciated.


RESPIRATORY: No accessory muscle use. Clear to auscultation. Breath sounds 

equal bilaterally. 


GASTROINTESTINAL: Abdomen soft, non-tender, nondistended. Hepatic and splenic 

margins not palpable. 


MUSCULOSKELETAL: No obvious deformities. No clubbing.  No cyanosis.  No edema. 


NEUROLOGICAL: Awake and alert. No obvious cranial nerve deficits.  Motor 

grossly within normal limits. Normal speech.


PSYCHIATRIC: Appropriate mood and affect; insight and judgment normal.Physical





Data


Data


Last Documented VS





Vital Signs








  Date Time  Temp Pulse Resp B/P (MAP) Pulse Ox O2 Delivery O2 Flow Rate FiO2


 


10/25/17 13:43 98.5 110 18 163/84 (110) 90   








Orders





 Orders


Urinalysis - C+S If Indicated (10/25/17 13:52)


Complete Blood Count With Diff (10/25/17 15:33)


Comprehensive Metabolic Panel (10/25/17 15:33)


Chest, Pa & Lat (10/25/17 15:33)


Albuterol-Ipratropium Neb (Duoneb Neb) (10/25/17 15:45)


Sodium Chlor 0.9% 1000 Ml Inj (Ns 1000 M (10/25/17 15:45)


Sodium Chlor 0.9% 1000 Ml Inj (Ns 1000 M (10/25/17 15:45)


Ondansetron Inj (Zofran Inj) (10/25/17 15:45)


Enteric Path (Stool) (10/25/17 15:33)





Labs





Laboratory Tests








Test


  10/25/17


14:23 10/25/17


15:48


 


Urine Collection Type CLEAN CATCH  


 


Urine Color YELLOW  


 


Urine Turbidity CLEAR  


 


Urine pH 7.5  


 


Urine Specific Gravity 1.023  


 


Urine Protein TRACE mg/dL  


 


Urine Glucose (UA) NEG mg/dL  


 


Urine Ketones 15 mg/dL  


 


Urine Occult Blood NEG  


 


Urine Nitrite NEG  


 


Urine Bilirubin NEG  


 


Urine Leukocyte Esterase NEG  


 


Urine RBC 0-3 /hpf  


 


Urine WBC 0-2 /hpf  


 


Urine Squamous Epithelial


Cells 6-8 /hpf 


  


 


 


Urine Bacteria OCC /hpf  


 


Urine Mucus MOD /lpf  


 


Microscopic Urinalysis Comment


  CULT NOT


INDICATED 


 


 


Urine Collection Time 14:23  


 


White Blood Count  6.2 TH/MM3 


 


Red Blood Count  4.22 MIL/MM3 


 


Hemoglobin  14.6 GM/DL 


 


Hematocrit  42.7 % 


 


Mean Corpuscular Volume  101.1 FL 


 


Mean Corpuscular Hemoglobin  34.5 PG 


 


Mean Corpuscular Hemoglobin


Concent 


  34.2 % 


 


 


Red Cell Distribution Width  13.9 % 


 


Platelet Count  145 TH/MM3 


 


Mean Platelet Volume  8.4 FL 


 


Neutrophils (%) (Auto)  65.6 % 


 


Lymphocytes (%) (Auto)  27.3 % 


 


Monocytes (%) (Auto)  4.3 % 


 


Eosinophils (%) (Auto)  0.6 % 


 


Basophils (%) (Auto)  2.2 % 


 


Neutrophils # (Auto)  4.1 TH/MM3 


 


Lymphocytes # (Auto)  1.7 TH/MM3 


 


Monocytes # (Auto)  0.3 TH/MM3 


 


Eosinophils # (Auto)  0.0 TH/MM3 


 


Basophils # (Auto)  0.1 TH/MM3 


 


CBC Comment  DIFF FINAL 


 


Differential Comment   


 


Sodium Level  135 MEQ/L 


 


Potassium Level  3.5 MEQ/L 


 


Chloride Level  97 MEQ/L 











[facility]


Medical Decision Making


Medical Screen Exam Complete:  Yes


Emergency Medical Condition:  Yes


Medical Record Reviewed:  Yes


Differential Diagnosis


Differential includes pneumonia, COPD exacerbation, bronchitis, gastroenteritis


Narrative Course


Lab work and x-ray have been ordered and will be evaluated by oncoming physician











Tigre Gallagher MD Oct 25, 2017 15:44

## 2017-10-25 NOTE — PD
Physical Exam


Date Seen by Provider:  Oct 25, 2017


Time Seen by Provider:  15:54


Narrative


The patient is a 52-year-old  female who was originally evaluated by 

the previous physician, Dr. Tobin.  Please refer to the initial history, 

physical, diagnostic evaluation, and treatment modality plan.  The patient was 

signed out at 4 PM which chest x-ray and laboratory evaluation pending for URI 

symptoms 2 weeks with productive cough and recent diarrhea.





Data


Data


Last Documented VS





Vital Signs








  Date Time  Temp Pulse Resp B/P (MAP) Pulse Ox O2 Delivery O2 Flow Rate FiO2


 


10/25/17 13:43 98.5 110 18 163/84 (110) 90   








Orders





 Orders


Urinalysis - C+S If Indicated (10/25/17 13:52)


Complete Blood Count With Diff (10/25/17 15:33)


Comprehensive Metabolic Panel (10/25/17 15:33)


Chest, Pa & Lat (10/25/17 15:33)


Albuterol-Ipratropium Neb (Duoneb Neb) (10/25/17 15:45)


Sodium Chlor 0.9% 1000 Ml Inj (Ns 1000 M (10/25/17 15:45)


Sodium Chlor 0.9% 1000 Ml Inj (Ns 1000 M (10/25/17 15:45)


Ondansetron Inj (Zofran Inj) (10/25/17 15:45)


Enteric Path (Stool) (10/25/17 15:33)


Prednisone (Deltasone) (10/25/17 17:30)





Labs





Laboratory Tests








Test


  10/25/17


14:23 10/25/17


15:48


 


Urine Collection Type CLEAN CATCH  


 


Urine Color YELLOW  


 


Urine Turbidity CLEAR  


 


Urine pH 7.5  


 


Urine Specific Gravity 1.023  


 


Urine Protein TRACE mg/dL  


 


Urine Glucose (UA) NEG mg/dL  


 


Urine Ketones 15 mg/dL  


 


Urine Occult Blood NEG  


 


Urine Nitrite NEG  


 


Urine Bilirubin NEG  


 


Urine Leukocyte Esterase NEG  


 


Urine RBC 0-3 /hpf  


 


Urine WBC 0-2 /hpf  


 


Urine Squamous Epithelial


Cells 6-8 /hpf 


  


 


 


Urine Bacteria OCC /hpf  


 


Urine Mucus MOD /lpf  


 


Microscopic Urinalysis Comment


  CULT NOT


INDICATED 


 


 


Urine Collection Time 14:23  


 


White Blood Count  6.2 TH/MM3 


 


Red Blood Count  4.22 MIL/MM3 


 


Hemoglobin  14.6 GM/DL 


 


Hematocrit  42.7 % 


 


Mean Corpuscular Volume  101.1 FL 


 


Mean Corpuscular Hemoglobin  34.5 PG 


 


Mean Corpuscular Hemoglobin


Concent 


  34.2 % 


 


 


Red Cell Distribution Width  13.9 % 


 


Platelet Count  145 TH/MM3 


 


Mean Platelet Volume  8.4 FL 


 


Neutrophils (%) (Auto)  65.6 % 


 


Lymphocytes (%) (Auto)  27.3 % 


 


Monocytes (%) (Auto)  4.3 % 


 


Eosinophils (%) (Auto)  0.6 % 


 


Basophils (%) (Auto)  2.2 % 


 


Neutrophils # (Auto)  4.1 TH/MM3 


 


Lymphocytes # (Auto)  1.7 TH/MM3 


 


Monocytes # (Auto)  0.3 TH/MM3 


 


Eosinophils # (Auto)  0.0 TH/MM3 


 


Basophils # (Auto)  0.1 TH/MM3 


 


CBC Comment  DIFF FINAL 


 


Differential Comment   


 


Blood Urea Nitrogen  6 MG/DL 


 


Creatinine  0.36 MG/DL 


 


Random Glucose  88 MG/DL 


 


Total Protein  6.7 GM/DL 


 


Albumin  3.6 GM/DL 


 


Calcium Level  8.6 MG/DL 


 


Alkaline Phosphatase  124 U/L 


 


Aspartate Amino Transf


(AST/SGOT) 


  266 U/L 


 


 


Alanine Aminotransferase


(ALT/SGPT) 


  123 U/L 


 


 


Total Bilirubin  1.1 MG/DL 


 


Sodium Level  135 MEQ/L 


 


Potassium Level  3.5 MEQ/L 


 


Chloride Level  97 MEQ/L 


 


Carbon Dioxide Level  28.8 MEQ/L 


 


Anion Gap  9 MEQ/L 


 


Estimat Glomerular Filtration


Rate 


  189 ML/MIN 


 











[facility]


Medical Record Reviewed:  Yes


Supervised Visit with SEAN:  No


Interpretation(s)





Laboratory Tests








Test


  10/25/17


14:23 10/25/17


15:48


 


Urine Collection Type CLEAN CATCH  


 


Urine Color YELLOW  


 


Urine Turbidity CLEAR  


 


Urine pH 7.5  


 


Urine Specific Gravity 1.023  


 


Urine Protein TRACE mg/dL  


 


Urine Glucose (UA) NEG mg/dL  


 


Urine Ketones 15 mg/dL  


 


Urine Occult Blood NEG  


 


Urine Nitrite NEG  


 


Urine Bilirubin NEG  


 


Urine Leukocyte Esterase NEG  


 


Urine RBC 0-3 /hpf  


 


Urine WBC 0-2 /hpf  


 


Urine Squamous Epithelial


Cells 6-8 /hpf 


  


 


 


Urine Bacteria OCC /hpf  


 


Urine Mucus MOD /lpf  


 


Microscopic Urinalysis Comment


  CULT NOT


INDICATED 


 


 


Urine Collection Time 14:23  


 


White Blood Count  6.2 TH/MM3 


 


Red Blood Count  4.22 MIL/MM3 


 


Hemoglobin  14.6 GM/DL 


 


Hematocrit  42.7 % 


 


Mean Corpuscular Volume  101.1 FL 


 


Mean Corpuscular Hemoglobin  34.5 PG 


 


Mean Corpuscular Hemoglobin


Concent 


  34.2 % 


 


 


Red Cell Distribution Width  13.9 % 


 


Platelet Count  145 TH/MM3 


 


Mean Platelet Volume  8.4 FL 


 


Neutrophils (%) (Auto)  65.6 % 


 


Lymphocytes (%) (Auto)  27.3 % 


 


Monocytes (%) (Auto)  4.3 % 


 


Eosinophils (%) (Auto)  0.6 % 


 


Basophils (%) (Auto)  2.2 % 


 


Neutrophils # (Auto)  4.1 TH/MM3 


 


Lymphocytes # (Auto)  1.7 TH/MM3 


 


Monocytes # (Auto)  0.3 TH/MM3 


 


Eosinophils # (Auto)  0.0 TH/MM3 


 


Basophils # (Auto)  0.1 TH/MM3 


 


CBC Comment  DIFF FINAL 


 


Differential Comment   


 


Blood Urea Nitrogen  6 MG/DL 


 


Creatinine  0.36 MG/DL 


 


Random Glucose  88 MG/DL 


 


Total Protein  6.7 GM/DL 


 


Albumin  3.6 GM/DL 


 


Calcium Level  8.6 MG/DL 


 


Alkaline Phosphatase  124 U/L 


 


Aspartate Amino Transf


(AST/SGOT) 


  266 U/L 


 


 


Alanine Aminotransferase


(ALT/SGPT) 


  123 U/L 


 


 


Total Bilirubin  1.1 MG/DL 


 


Sodium Level  135 MEQ/L 


 


Potassium Level  3.5 MEQ/L 


 


Chloride Level  97 MEQ/L 


 


Carbon Dioxide Level  28.8 MEQ/L 


 


Anion Gap  9 MEQ/L 


 


Estimat Glomerular Filtration


Rate 


  189 ML/MIN 


 








Differential Diagnosis


Differential diagnosis includes pneumonia, viral syndrome, influenza, colitis, 

enteritis, dehydration, COPD exacerbation.


Narrative Course


The patient was initially evaluated by the previous physician, Dr. Tobin.  

Please refer to the initial history, physical, diagnostic evaluation, and 

treatment modality plan.  The patient was signed out of 4 PM a chest x-ray and 

laboratory evaluation pending.  UA was unremarkable.  The patient's white count 

is unremarkable.  AST and ALT are elevated, AST greater than ALT.  MCV is 

slightly elevated at 101.  Chest x-rays unremarkable.  I had a discussion with 

the patient at 5:10 PM she was reevaluated, her symptoms had significantly 

improved.  O2 sat was 94% on room air.  The patient appears to have bronchitis 

and underlying viral syndrome.  The patient is taking over-the-counter Imodium 

and drinking plenty of fluids without any vomiting.  She'll be sent home on 

prednisone, albuterol nebulizers, and Zithromax.  She is advised to follow-up 

with her primary physician, Dr. Greenberg, and will be provided a copy of her x-

ray results and lab results at discharge.  She is advised to return if symptoms 

worsen or progress.


Diagnosis





 Primary Impression:  


 COPD with acute exacerbation


 Additional Impression:  


 Diarrhea


 Qualified Codes:  R19.7 - Diarrhea, unspecified


Patient Instructions:  General Instructions





***Additional Instruction:  


Medications as directed.  Return if symptoms worsen or progress.  Please 

provide the patient a copy of her x-ray results and lab results at discharge.  

Follow-up with Dr. Greenberg.


***Med/Other Pt SpecificInfo:  Prescription(s) given


Scripts


Albuterol Neb (Albuterol Neb) 2.5 Mg/3 Ml Neb


2.5 MG NEB Q4HR NEB Y for SHORTNESS OF BREATH, #60 NEBULE 0 Refills


   Prov: Kirill Dillard MD         10/25/17 


Azithromycin (Zithromax Z-Angelito) 250 Mg Dspk


250 MG PO AS DIRECTED for Infection, #1 DSPK 0 Refills


   500 MG (2 tabs) day 1, then 1 tab days 2-5.


   Prov: Kirill Dillard MD         10/25/17 


Prednisone (Deltasone) 20 Mg Tab


40 MG PO DAILY for 4 Days, #8 TAB 0 Refills


   Prov: Kirill Dillard MD         10/25/17


Disposition:  01 DISCHARGE HOME


Condition:  Stable











Kirill Dillard MD Oct 25, 2017 15:55

## 2018-04-06 ENCOUNTER — HOSPITAL ENCOUNTER (INPATIENT)
Dept: HOSPITAL 17 - PHED | Age: 53
LOS: 7 days | Discharge: HOME | DRG: 391 | End: 2018-04-13
Attending: FAMILY MEDICINE | Admitting: FAMILY MEDICINE
Payer: SELF-PAY

## 2018-04-06 VITALS
TEMPERATURE: 97.5 F | RESPIRATION RATE: 20 BRPM | OXYGEN SATURATION: 98 % | DIASTOLIC BLOOD PRESSURE: 62 MMHG | HEART RATE: 74 BPM | SYSTOLIC BLOOD PRESSURE: 131 MMHG

## 2018-04-06 VITALS
TEMPERATURE: 98.8 F | DIASTOLIC BLOOD PRESSURE: 73 MMHG | RESPIRATION RATE: 16 BRPM | SYSTOLIC BLOOD PRESSURE: 138 MMHG | HEART RATE: 116 BPM | OXYGEN SATURATION: 95 %

## 2018-04-06 VITALS — OXYGEN SATURATION: 81 % | RESPIRATION RATE: 18 BRPM

## 2018-04-06 VITALS
DIASTOLIC BLOOD PRESSURE: 65 MMHG | HEART RATE: 86 BPM | RESPIRATION RATE: 16 BRPM | SYSTOLIC BLOOD PRESSURE: 113 MMHG | OXYGEN SATURATION: 95 %

## 2018-04-06 VITALS — OXYGEN SATURATION: 94 % | RESPIRATION RATE: 18 BRPM

## 2018-04-06 VITALS
SYSTOLIC BLOOD PRESSURE: 102 MMHG | HEART RATE: 79 BPM | RESPIRATION RATE: 16 BRPM | DIASTOLIC BLOOD PRESSURE: 64 MMHG | TEMPERATURE: 98.5 F | OXYGEN SATURATION: 96 %

## 2018-04-06 VITALS
OXYGEN SATURATION: 96 % | RESPIRATION RATE: 18 BRPM | HEART RATE: 92 BPM | SYSTOLIC BLOOD PRESSURE: 123 MMHG | DIASTOLIC BLOOD PRESSURE: 62 MMHG

## 2018-04-06 VITALS — OXYGEN SATURATION: 95 %

## 2018-04-06 VITALS — WEIGHT: 108.03 LBS | BODY MASS INDEX: 22.68 KG/M2 | HEIGHT: 58 IN

## 2018-04-06 VITALS — OXYGEN SATURATION: 96 %

## 2018-04-06 DIAGNOSIS — E86.0: ICD-10-CM

## 2018-04-06 DIAGNOSIS — N83.201: ICD-10-CM

## 2018-04-06 DIAGNOSIS — J44.1: ICD-10-CM

## 2018-04-06 DIAGNOSIS — E78.00: ICD-10-CM

## 2018-04-06 DIAGNOSIS — F32.9: ICD-10-CM

## 2018-04-06 DIAGNOSIS — K76.0: ICD-10-CM

## 2018-04-06 DIAGNOSIS — Z88.5: ICD-10-CM

## 2018-04-06 DIAGNOSIS — F17.210: ICD-10-CM

## 2018-04-06 DIAGNOSIS — F10.20: ICD-10-CM

## 2018-04-06 DIAGNOSIS — R16.0: ICD-10-CM

## 2018-04-06 DIAGNOSIS — K58.0: Primary | ICD-10-CM

## 2018-04-06 DIAGNOSIS — J96.01: ICD-10-CM

## 2018-04-06 DIAGNOSIS — R00.0: ICD-10-CM

## 2018-04-06 DIAGNOSIS — E87.6: ICD-10-CM

## 2018-04-06 DIAGNOSIS — R63.4: ICD-10-CM

## 2018-04-06 DIAGNOSIS — F41.9: ICD-10-CM

## 2018-04-06 DIAGNOSIS — Z88.6: ICD-10-CM

## 2018-04-06 LAB
ALBUMIN SERPL-MCNC: 3.2 GM/DL (ref 3.4–5)
ALP SERPL-CCNC: 524 U/L (ref 45–117)
ALT SERPL-CCNC: 99 U/L (ref 10–53)
AST SERPL-CCNC: 200 U/L (ref 15–37)
BASOPHILS # BLD AUTO: 0 TH/MM3 (ref 0–0.2)
BASOPHILS NFR BLD: 0.3 % (ref 0–2)
BILIRUB SERPL-MCNC: 3.2 MG/DL (ref 0.2–1)
BUN SERPL-MCNC: 7 MG/DL (ref 7–18)
CALCIUM SERPL-MCNC: 8.4 MG/DL (ref 8.5–10.1)
CHLORIDE SERPL-SCNC: 89 MEQ/L (ref 98–107)
CREAT SERPL-MCNC: 0.54 MG/DL (ref 0.5–1)
EOSINOPHIL # BLD: 0 TH/MM3 (ref 0–0.4)
EOSINOPHIL NFR BLD: 0.2 % (ref 0–4)
ERYTHROCYTE [DISTWIDTH] IN BLOOD BY AUTOMATED COUNT: 16.3 % (ref 11.6–17.2)
GFR SERPLBLD BASED ON 1.73 SQ M-ARVRAT: 118 ML/MIN (ref 89–?)
GLUCOSE SERPL-MCNC: 125 MG/DL (ref 74–106)
HCO3 BLD-SCNC: 32.2 MEQ/L (ref 21–32)
HCT VFR BLD CALC: 41 % (ref 35–46)
HGB BLD-MCNC: 14.1 GM/DL (ref 11.6–15.3)
LYMPHOCYTES # BLD AUTO: 1.1 TH/MM3 (ref 1–4.8)
LYMPHOCYTES NFR BLD AUTO: 14.3 % (ref 9–44)
MCH RBC QN AUTO: 35 PG (ref 27–34)
MCHC RBC AUTO-ENTMCNC: 34.3 % (ref 32–36)
MCV RBC AUTO: 101.9 FL (ref 80–100)
MONOCYTE #: 0.8 TH/MM3 (ref 0–0.9)
MONOCYTES NFR BLD: 9.9 % (ref 0–8)
NEUTROPHILS # BLD AUTO: 6.1 TH/MM3 (ref 1.8–7.7)
NEUTROPHILS NFR BLD AUTO: 75.3 % (ref 16–70)
PLATELET # BLD: 203 TH/MM3 (ref 150–450)
PMV BLD AUTO: 8.6 FL (ref 7–11)
PROT SERPL-MCNC: 6.4 GM/DL (ref 6.4–8.2)
RBC # BLD AUTO: 4.02 MIL/MM3 (ref 4–5.3)
SODIUM SERPL-SCNC: 133 MEQ/L (ref 136–145)
WBC # BLD AUTO: 8 TH/MM3 (ref 4–11)

## 2018-04-06 PROCEDURE — 93005 ELECTROCARDIOGRAM TRACING: CPT

## 2018-04-06 PROCEDURE — 36600 WITHDRAWAL OF ARTERIAL BLOOD: CPT

## 2018-04-06 PROCEDURE — 96361 HYDRATE IV INFUSION ADD-ON: CPT

## 2018-04-06 PROCEDURE — 94060 EVALUATION OF WHEEZING: CPT

## 2018-04-06 PROCEDURE — 82550 ASSAY OF CK (CPK): CPT

## 2018-04-06 PROCEDURE — 87506 IADNA-DNA/RNA PROBE TQ 6-11: CPT

## 2018-04-06 PROCEDURE — C9113 INJ PANTOPRAZOLE SODIUM, VIA: HCPCS

## 2018-04-06 PROCEDURE — 96365 THER/PROPH/DIAG IV INF INIT: CPT

## 2018-04-06 PROCEDURE — 76377 3D RENDER W/INTRP POSTPROCES: CPT

## 2018-04-06 PROCEDURE — 74177 CT ABD & PELVIS W/CONTRAST: CPT

## 2018-04-06 PROCEDURE — 82390 ASSAY OF CERULOPLASMIN: CPT

## 2018-04-06 PROCEDURE — 88305 TISSUE EXAM BY PATHOLOGIST: CPT

## 2018-04-06 PROCEDURE — 83550 IRON BINDING TEST: CPT

## 2018-04-06 PROCEDURE — 94618 PULMONARY STRESS TESTING: CPT

## 2018-04-06 PROCEDURE — 94664 DEMO&/EVAL PT USE INHALER: CPT

## 2018-04-06 PROCEDURE — 94640 AIRWAY INHALATION TREATMENT: CPT

## 2018-04-06 PROCEDURE — 83516 IMMUNOASSAY NONANTIBODY: CPT

## 2018-04-06 PROCEDURE — 82805 BLOOD GASES W/O2 SATURATION: CPT

## 2018-04-06 PROCEDURE — 80053 COMPREHEN METABOLIC PANEL: CPT

## 2018-04-06 PROCEDURE — 86038 ANTINUCLEAR ANTIBODIES: CPT

## 2018-04-06 PROCEDURE — 74181 MRI ABDOMEN W/O CONTRAST: CPT

## 2018-04-06 PROCEDURE — 83690 ASSAY OF LIPASE: CPT

## 2018-04-06 PROCEDURE — 87329 GIARDIA AG IA: CPT

## 2018-04-06 PROCEDURE — 76937 US GUIDE VASCULAR ACCESS: CPT

## 2018-04-06 PROCEDURE — 71045 X-RAY EXAM CHEST 1 VIEW: CPT

## 2018-04-06 PROCEDURE — 83540 ASSAY OF IRON: CPT

## 2018-04-06 PROCEDURE — 82728 ASSAY OF FERRITIN: CPT

## 2018-04-06 PROCEDURE — 85025 COMPLETE CBC W/AUTO DIFF WBC: CPT

## 2018-04-06 PROCEDURE — 84155 ASSAY OF PROTEIN SERUM: CPT

## 2018-04-06 PROCEDURE — 87328 CRYPTOSPORIDIUM AG IA: CPT

## 2018-04-06 PROCEDURE — 82784 ASSAY IGA/IGD/IGG/IGM EACH: CPT

## 2018-04-06 PROCEDURE — 87493 C DIFF AMPLIFIED PROBE: CPT

## 2018-04-06 PROCEDURE — 96375 TX/PRO/DX INJ NEW DRUG ADDON: CPT

## 2018-04-06 PROCEDURE — 83520 IMMUNOASSAY QUANT NOS NONAB: CPT

## 2018-04-06 PROCEDURE — 86255 FLUORESCENT ANTIBODY SCREEN: CPT

## 2018-04-06 PROCEDURE — 80048 BASIC METABOLIC PNL TOTAL CA: CPT

## 2018-04-06 PROCEDURE — 80074 ACUTE HEPATITIS PANEL: CPT

## 2018-04-06 PROCEDURE — 82103 ALPHA-1-ANTITRYPSIN TOTAL: CPT

## 2018-04-06 PROCEDURE — 82787 IGG 1 2 3 OR 4 EACH: CPT

## 2018-04-06 PROCEDURE — 83605 ASSAY OF LACTIC ACID: CPT

## 2018-04-06 PROCEDURE — 80076 HEPATIC FUNCTION PANEL: CPT

## 2018-04-06 RX ADMIN — ONDANSETRON PRN MG: 2 INJECTION, SOLUTION INTRAMUSCULAR; INTRAVENOUS at 18:49

## 2018-04-06 RX ADMIN — ZOLPIDEM TARTRATE PRN MG: 10 TABLET, FILM COATED ORAL at 23:05

## 2018-04-06 RX ADMIN — PHENYTOIN SODIUM SCH MLS/HR: 50 INJECTION INTRAMUSCULAR; INTRAVENOUS at 23:16

## 2018-04-06 RX ADMIN — PHENYTOIN SODIUM SCH MLS/HR: 50 INJECTION INTRAMUSCULAR; INTRAVENOUS at 15:44

## 2018-04-06 RX ADMIN — PANTOPRAZOLE SODIUM SCH MG: 40 INJECTION, POWDER, FOR SOLUTION INTRAVENOUS at 17:26

## 2018-04-06 RX ADMIN — Medication PRN ML: at 15:53

## 2018-04-06 RX ADMIN — Medication SCH ML: at 23:10

## 2018-04-06 RX ADMIN — Medication PRN ML: at 18:49

## 2018-04-06 RX ADMIN — HYDROMORPHONE HYDROCHLORIDE PRN MG: 2 INJECTION INTRAMUSCULAR; INTRAVENOUS; SUBCUTANEOUS at 20:04

## 2018-04-06 RX ADMIN — Medication PRN ML: at 17:26

## 2018-04-06 RX ADMIN — SODIUM CHLORIDE SCH MLS/HR: 900 INJECTION, SOLUTION INTRAVENOUS at 23:06

## 2018-04-06 RX ADMIN — HYDROMORPHONE HYDROCHLORIDE PRN MG: 2 INJECTION INTRAMUSCULAR; INTRAVENOUS; SUBCUTANEOUS at 15:52

## 2018-04-06 RX ADMIN — POTASSIUM CHLORIDE, DEXTROSE MONOHYDRATE AND SODIUM CHLORIDE SCH MLS/HR: 150; 5; 450 INJECTION, SOLUTION INTRAVENOUS at 23:09

## 2018-04-06 RX ADMIN — IPRATROPIUM BROMIDE AND ALBUTEROL SULFATE SCH AMPULE: .5; 3 SOLUTION RESPIRATORY (INHALATION) at 19:36

## 2018-04-06 RX ADMIN — METHYLPREDNISOLONE SODIUM SUCCINATE SCH MG: 40 INJECTION, POWDER, FOR SOLUTION INTRAMUSCULAR; INTRAVENOUS at 20:03

## 2018-04-06 RX ADMIN — Medication PRN ML: at 14:51

## 2018-04-06 NOTE — EKG
Date Performed: 04/06/2018       Time Performed: 11:28:38

 

PTAGE:      53 years

 

EKG:      SINUS TACHYCARDIA ST/T-WAVE ABNORMALITY, CONSIDER LATERAL ISCHEMIA ST/T-WAVE ABNORMALITY, C
ONSIDER INFERIOR ISCHEMIA ABNORMAL ECG

 

PREVIOUS TRACING       : 07/16/2016 18.16 Compared to previous tracing, heart rate has increased, ST/
T changes are now present.

 

DOCTOR:   Antoni Solano  Interpretating Date/Time  04/06/2018 15:59:07

## 2018-04-06 NOTE — PD
HPI


Chief Complaint:  GI Complaint


Time Seen by Provider:  11:10


Travel History


International Travel<30 days:  No


Contact w/Intl Traveler<30days:  No


Traveled to known affect area:  No





History of Present Illness


HPI


The patient is a 53-year-old  female who presents to the emergency 

department for nausea, vomiting, diarrhea, and abdominal pain.  The patient's 

symptoms started 3 weeks ago but it progressed in the last 3 weeks.  The 

patient states she has a history of ulcerative colitis that was diagnosed by 

colonoscopy in [place].  The patient is currently followed by her primary 

physician Dr. Bryan.  The patient saw her physician was placed on prednisone 

and Flagyl.  However, despite the prednisone and Flagyl her symptoms are 

progressing.  She does complain of diarrhea which is loose, watery, brown, with 

occasional bloody mucus.  The patient does not have a gastroenterologist 

locally and her last colonoscopy was 5 years ago.  She complains of dehydration

, weakness, and fatigue with exertion.  She also notes a dry nonproductive 

cough.  She does have a history of COPD and asthma.  The patient also notes 

fever as high as 101 this morning at home.  She denies any dysuria, frequency, 

or urgency.  She did receive an influenza vaccination this year.  Symptoms are 

moderate.  The patient also states that her weight went from 128 to 108 and 

attributes it to possible dehydration.





PFSH


Past Medical History


Hx Anticoagulant Therapy:  No


Autoimmune Disease:  No


Cancer:  No


Cardiovascular Problems:  Yes


High Cholesterol:  Yes


Chemotherapy:  No


Diabetes:  No


Diminished Hearing:  No


Endocrine:  No


Gastrointestinal Disorders:  Yes


GERD:  No


Genitourinary:  No


Hiatal Hernia:  No


Immune Disorder:  No


Musculoskeletal:  No


Neurologic:  No


Psychiatric:  No


Reproductive:  No


Respiratory:  Yes


Radiation Therapy:  No


Sickle Cell Disease:  No


Thyroid Disease:  No


Pregnant?:  Not Pregnant


Menopausal:  Yes


:  2


Para:  2





Past Surgical History


Abdominal Surgery:  Yes (Hernia repair)


Cardiac Surgery:  No


 Section:  Yes (X's 2)


Cholecystectomy:  Yes


Ear Surgery:  No


Endocrine Surgery:  No


Eye Surgery:  No


Genitourinary Surgery:  No


Gynecologic Surgery:  Yes (2 c-sections)


Neurologic Surgery:  Yes (Sinus)


Oral Surgery:  No


Pacemaker:  No


Thoracic Surgery:  No


Tonsillectomy:  Yes


Other Surgery:  Yes





Social History


Alcohol Use:  Yes (Soc.)


Tobacco Use:  Yes (1 PPD)


Substance Use:  No





Allergies-Medications


(Allergen,Severity, Reaction):  


Coded Allergies:  


     codeine (Unverified  Allergy, Severe, INCREASED LIVER ENZYMES, 18)


     meperidine (Unverified  Allergy, Severe, UNKNOWN, 18)


     acetaminophen (Unverified  Adverse Reaction, Severe, LIVER PROBLEMS, 18

)


     morphine (Unverified  Adverse Reaction, Severe, LIVER PROBLEMS, 18)


Reported Meds & Prescriptions





Reported Meds & Active Scripts


Active


Reported


Prednisone 20 Mg Tab 20 Mg PO AS DIRECTED


     40 MG twice a day x 3 days, then 20 MG daily x 3 days, then 10 MG


     daily x 3 days


Hyoscyamine ER 12 HR (Hyoscyamine Sulfate) 0.375 Ines 0.375 Mg PO BID


Ambien (Zolpidem Tartrate) 10 Mg Tab 10 Mg PO HS PRN


Flagyl (Metronidazole) 500 Mg Tab 500 Mg PO Q8HR


Escitalopram (Escitalopram Oxalate) 10 Mg Tab 10 Mg PO DAILY








Review of Systems


Except as stated in HPI:  all other systems reviewed are Neg


General / Constitutional:  Positive: Fever, Weight Loss (Patient states she 

went from 128 to 108 in the last 3 weeks)


Respiratory:  Positive: Cough


Gastrointestinal:  Positive: Nausea, Vomiting, Diarrhea, Abdominal Pain, 

Changes in Bowel Habits


Genitourinary:  No: Dysuria


Musculoskeletal:  Positive: Weakness


Skin:  No Rash





Physical Exam


Narrative


GENERAL: Awake, alert, pleasant 53-year-old female who appears her stated age 

and is in no acute respiratory distress.


SKIN: Focused skin assessment warm/dry.


HEAD: Atraumatic. Normocephalic. 


EYES: Pupils equal and round. No scleral icterus. No injection or drainage. 


ENT: No nasal bleeding or discharge.  Poor dentition.  Dry mucous membranes.


NECK: Trachea midline. No JVD. 


CARDIOVASCULAR: Regular, tachycardic with a heart rate in the 120s.


RESPIRATORY: No accessory muscle use.  Few scattered wheezes.


GASTROINTESTINAL: Abdomen soft, tender to palpation lower quadrants 

bilaterally.  Well-healed midline scar inferior to the umbilicus.


MUSCULOSKELETAL: No obvious deformities. No clubbing.  No cyanosis.  No edema. 


NEUROLOGICAL: Awake and alert. No obvious cranial nerve deficits.  Motor 

grossly within normal limits. Normal speech.


PSYCHIATRIC: Appropriate mood and affect; insight and judgment normal.





Data


Data


Last Documented VS





Vital Signs








  Date Time  Temp Pulse Resp B/P (MAP) Pulse Ox O2 Delivery O2 Flow Rate FiO2


 


18 13:43   16     


 


18 13:26  86  113/65 (81) 95 Nasal Cannula 4.00 


 


18 09:24 98.8       








Orders





 Orders


Complete Blood Count With Diff (18 11:19)


Comprehensive Metabolic Panel (18 11:19)


Lipase (18 11:19)


Lactic Acid (18 11:19)


Urinalysis - C+S If Indicated (18 11:19)


Ct Abd/Pel W Iv Contrast(Rout) (18 11:19)


Iv Access Insert/Monitor (18 11:19)


Ecg Monitoring (18 11:19)


Oximetry (18 11:19)


Ondansetron Inj (Zofran Inj) (18 11:30)


Sodium Chlor 0.9% 1000 Ml Inj (Ns 1000 M (18 11:19)


Sodium Chloride 0.9% Flush (Ns Flush) (18 11:30)


Electrocardiogram (18 11:19)


Chest, Single Ap (18 11:19)


Diatrizoate Liq (Md Gastroview Liq) (18 11:24)


Hydromorphone Pf Inj (Dilaudid Pf Inj) (18 12:00)


Diatrizoate Liq (Md Gastroview Liq) (18 11:51)


Potassium Chloride (Kcl) (18 12:45)


Iohexol 350 Inj (Omnipaque 350 Inj) (18 12:48)


Ciprofloxacin 400 Mg Premix (Cipro 400 M (18 13:15)


Metronidazole 500 Mg Inj (Flagyl 500 Mg (18 13:15)


Consult General Surgery (18 )


(Hub Use Only)Inp Phy Cons/Ref (18 )


Admit Order (Ed Use Only) (18 13:46)





Labs





Laboratory Tests








Test


  18


11:31


 


White Blood Count 8.0 TH/MM3 


 


Red Blood Count 4.02 MIL/MM3 


 


Hemoglobin 14.1 GM/DL 


 


Hematocrit 41.0 % 


 


Mean Corpuscular Volume 101.9 FL 


 


Mean Corpuscular Hemoglobin 35.0 PG 


 


Mean Corpuscular Hemoglobin


Concent 34.3 % 


 


 


Red Cell Distribution Width 16.3 % 


 


Platelet Count 203 TH/MM3 


 


Mean Platelet Volume 8.6 FL 


 


Neutrophils (%) (Auto) 75.3 % 


 


Lymphocytes (%) (Auto) 14.3 % 


 


Monocytes (%) (Auto) 9.9 % 


 


Eosinophils (%) (Auto) 0.2 % 


 


Basophils (%) (Auto) 0.3 % 


 


Neutrophils # (Auto) 6.1 TH/MM3 


 


Lymphocytes # (Auto) 1.1 TH/MM3 


 


Monocytes # (Auto) 0.8 TH/MM3 


 


Eosinophils # (Auto) 0.0 TH/MM3 


 


Basophils # (Auto) 0.0 TH/MM3 


 


CBC Comment DIFF FINAL 


 


Differential Comment  


 


Blood Urea Nitrogen 7 MG/DL 


 


Creatinine 0.54 MG/DL 


 


Random Glucose 125 MG/DL 


 


Total Protein 6.4 GM/DL 


 


Albumin 3.2 GM/DL 


 


Calcium Level 8.4 MG/DL 


 


Alkaline Phosphatase 524 U/L 


 


Aspartate Amino Transf


(AST/SGOT) 200 U/L 


 


 


Alanine Aminotransferase


(ALT/SGPT) 99 U/L 


 


 


Total Bilirubin 3.2 MG/DL 


 


Sodium Level 133 MEQ/L 


 


Potassium Level 2.7 MEQ/L 


 


Chloride Level 89 MEQ/L 


 


Carbon Dioxide Level 32.2 MEQ/L 


 


Anion Gap 12 MEQ/L 


 


Estimat Glomerular Filtration


Rate 118 ML/MIN 


 


 


Lactic Acid Level 2.3 mmol/L 


 


Lipase 84 U/L 











[facility]


Medical Decision Making


Medical Screen Exam Complete:  Yes


Emergency Medical Condition:  Yes


Medical Record Reviewed:  Yes


Interpretation(s)


EKG reveals sinus tachycardia with a heart rate of 118.  Inverted T waves noted 

in lead V5, V6, II, III, and aVF.








Last Impressions








Chest X-Ray 18 Signed





Impressions: 





 Service Date/Time:  2018 11:28 - CONCLUSION:  No acute 





 cardiopulmonary process to explain current clinical symptoms.     Malachi Smith MD 


 


Abdomen/Pelvis CT 18 Signed





Impressions: 





 Service Date/Time:  2018 12:37 - CONCLUSION:  1. Acute on 





 chronic appendicitis suspected in the proper clinical setting; there is an 





 indurated appearing fluid-filled viscus that appears to be blind ending 





 posteromedial to the cecum. No free air seen. 2. Enlarged and severely fatty 





 infiltrated liver. 3. Right ovarian cyst.     Familia Mcgrath MD 








Laboratory Tests








Test


  18


11:31


 


White Blood Count 8.0 TH/MM3 


 


Red Blood Count 4.02 MIL/MM3 


 


Hemoglobin 14.1 GM/DL 


 


Hematocrit 41.0 % 


 


Mean Corpuscular Volume 101.9 FL 


 


Mean Corpuscular Hemoglobin 35.0 PG 


 


Mean Corpuscular Hemoglobin


Concent 34.3 % 


 


 


Red Cell Distribution Width 16.3 % 


 


Platelet Count 203 TH/MM3 


 


Mean Platelet Volume 8.6 FL 


 


Neutrophils (%) (Auto) 75.3 % 


 


Lymphocytes (%) (Auto) 14.3 % 


 


Monocytes (%) (Auto) 9.9 % 


 


Eosinophils (%) (Auto) 0.2 % 


 


Basophils (%) (Auto) 0.3 % 


 


Neutrophils # (Auto) 6.1 TH/MM3 


 


Lymphocytes # (Auto) 1.1 TH/MM3 


 


Monocytes # (Auto) 0.8 TH/MM3 


 


Eosinophils # (Auto) 0.0 TH/MM3 


 


Basophils # (Auto) 0.0 TH/MM3 


 


CBC Comment DIFF FINAL 


 


Differential Comment  


 


Blood Urea Nitrogen 7 MG/DL 


 


Creatinine 0.54 MG/DL 


 


Random Glucose 125 MG/DL 


 


Total Protein 6.4 GM/DL 


 


Albumin 3.2 GM/DL 


 


Calcium Level 8.4 MG/DL 


 


Alkaline Phosphatase 524 U/L 


 


Aspartate Amino Transf


(AST/SGOT) 200 U/L 


 


 


Alanine Aminotransferase


(ALT/SGPT) 99 U/L 


 


 


Total Bilirubin 3.2 MG/DL 


 


Sodium Level 133 MEQ/L 


 


Potassium Level 2.7 MEQ/L 


 


Chloride Level 89 MEQ/L 


 


Carbon Dioxide Level 32.2 MEQ/L 


 


Anion Gap 12 MEQ/L 


 


Estimat Glomerular Filtration


Rate 118 ML/MIN 


 


 


Lactic Acid Level 2.3 mmol/L 


 


Lipase 84 U/L 








Differential Diagnosis


Differential diagnosis includes ulcerative colitis exacerbation, abscess, 

perforation, gastroenteritis, dehydration, pancreatitis, lower lobe pneumonia, 

pyelonephritis, colitis, enteritis, C. difficile.


Narrative Course


IV was established, labs are drawn and sent, the patient was placed on cardiac 

telemetry monitoring and continuous pulse oximetry monitoring.  EKG was ordered 

and interpreted.  Chest x-ray was obtained.  The patient was administered 

Dilaudid 0.5 mg intravenously, Zofran 4 mg intravenously, and 1 L of IV fluids.

  CT of the abdomen and pelvis with oral and IV contrast was obtained.  White 

count is unremarkable.  The patient's LFTs are elevated and bilirubin are 

elevated, patient has had previous cholecystectomy.  CT the abdomen and pelvis 

reveals possible acute on chronic appendicitis suspect in the proper clinical 

setting, there is an indurated appearing fluid-filled viscus it appears to be 

blind ending posterior medial to the cecum.  No free air is seen.  Enlarged and 

severely fatty infiltrated liver and right ovarian cyst.  Therefore, a call was 

placed to the on-call general surgeon, Dr. Strauss, at 1:17 PM.  A call was then 

placed to the patient's primary physician, Dr. Bryan, however, he is 

[facility].





I discussed the patient with Dr. Strauss who reviewed the CT and will discuss the 

patient with the radiologist.  He request transfer to [facility] as 

patient may need further evaluation for possible primary biliary cancer as she 

does have a history of ulcerative colitis with increasing LFTs and bilirubin 

level despite previous cholecystectomy.  She will also need evaluation by 

gastroenterology and may need colorectal involvement if this is a diverticulum 

and/or abscess.  Therefore, the patient will be transferred to [facility].  I discussed the findings and discussion with the patient at bedside 

who is comfortable with plan of care.





Physician Communication


Physician Communication


A call was placed to [facility] for admission.  I discussed the 

patient with Dr. Vasquez who agrees with admission.





Diagnosis





 Primary Impression:  


 Abdominal pain


 Qualified Codes:  R10.31 - Right lower quadrant pain


 Additional Impression:  


 Ulcerative colitis


 Qualified Codes:  K51.919 - Ulcerative colitis, unspecified with unspecified 

complications





Admitting Information


Admitting Physician Requests:  Admit


Condition:  Stable











Kirill Dillard MD 2018 11:28

## 2018-04-06 NOTE — MB
cc:

Sonny Nagy MD

****

 

 

DATE:

04/06/2018

 

DATE OF CONSULTATION:

04/06/2018.

 

REQUESTING PHYSICIAN:

Dr. Elin Vasquez.

 

REASON FOR CONSULTATION:

Shortness of breath.

 

HISTORY OF PRESENT ILLNESS:

Ms. Irene is a 53-year-old female with a history of COPD, 

diverticulitis going on for many years, but over the last 3 weeks, she

is having abdominal pain.  She also has blood and mucus in the stools.

 Also had some fever.  She states that she took some antibiotic and 

steroid, did not get better.  Because the symptoms were persistent, 

she decided to come to the emergency room.  She was evaluated in the 

emergency room.  Her chest x-ray shows no acute cardiopulmonary 

process.  CT scan of the abdomen shows acute-on-chronic appendicitis 

suspected.  The patient is being transferred to [facility] for 

surgical evaluation.

 

LABORATORY DATA:

Her CBC shows WBC 21.0, hemoglobin 14.1, hematocrit 41, , 

platelet count 203.  Sodium 133, potassium 2.7, chloride 89, CO2 of 

32, BUN 7, creatinine 0.54.  Blood gas pH 7.41, pCO2 of 49, pO2 of 42,

pO2 of 52 on room air.

 

PAST MEDICAL HISTORY:

Significant for history of diverticulitis, COPD, anxiety, depression, 

hernia surgery.

 

MEDICATIONS:

She is currently taking ciprofloxacin IV, Flagyl IV, Solu-Medrol 40 mg

q. 12 hours, albuterol after nebulizer treatment, Dilaudid for pain.

 

ALLERGIES:

ACETAMINOPHEN, CODEINE, MEPERIDINE, AND MORPHINE.

 

SOCIAL HISTORY:

She is  for 3 years now, has a history of smoking 2-3 packs a 

day and takes about 4 drinks a day.

 

FAMILY HISTORY:

She has grown children.

 

REVIEW OF SYSTEMS:

Normally she is active.  She is not feeling well for the last 3 weeks 

and has lost a lot of weight because of the poor appetite.  No 

seizure, stroke or epilepsy.  No DVT or pulmonary embolism.

 

PHYSICAL EXAMINATION:

GENERAL:  Elderly female, mildly short of breath.

VITAL SIGNS:  Blood pressure 123/62, heart rate 92, respirations 18, 

temperature 98.

HEENT:  Pupils are equal and reactive to light.  Oral mucosa and nasal

mucosa normal.

NECK:  Supple.  JVP not raised.

CHEST:  Equal air entryequal, has expiratory rhonchi.

CARDIOVASCULAR:  S1, S2 normal.

ABDOMEN:  Soft, nondistended, mild tenderness.

EXTREMITIES:  No edema.

 

IMPRESSION:

1.  Shortness of breath and hypoxia which is out of proportion, no 

pulmonary symptoms.  Need to rule out pulmonary embolism.

2.  Chronic obstructive pulmonary disease exacerbation.

3.  Diverticulitis.

4.  Anxiety and depression.

 

PLAN:

The patient has been transferred to [facility].  Get a CT 

of the chest to rule out pulmonary embolism.  Continue aerosol 

treatment, IV Solu-Medrol, supplemental oxygen.  Further treatment 

pending clinical course in the hospital.

 

Thank you, Dr. Elin Vasquez, for this consult.

 

 

__________________________________

MD BIENVENIDO Barriga/DICK

D: 04/06/2018, 06:20 PM

T: 04/06/2018, 06:52 PM

Visit #: [id number]

Job #: [id number]

MTDD

## 2018-04-06 NOTE — RADRPT
EXAM DATE/TIME:  2018 11:28 

 

[facility] COMPARISON:     

CHEST SINGLE AP, 2016, 19:07.

 

                     

INDICATIONS :     

Cough for two days.

                     

 

MEDICAL HISTORY :            

Smoker.   

 

SURGICAL HISTORY :        

 section. Tonsillectomy. Cholecystectomy.

 

ENCOUNTER:     

Initial                                        

 

ACUITY:     

2 days      

 

PAIN SCORE:     

0/10

 

LOCATION:     

Bilateral chest 

 

FINDINGS:     

A single view of the chest demonstrates the lungs to be symmetrically aerated without evidence of mas
s, infiltrate or effusion.  The cardiomediastinal contours are unremarkable.  Osseous structures are 
intact.

 

CONCLUSION:     

No acute cardiopulmonary process to explain current clinical symptoms.

 

 

 

 Malachi Smith MD on 2018 at 11:58           

Board Certified Radiologist.

 This report was verified electronically.

## 2018-04-06 NOTE — HHI.HP
cc:   Jamey Greenberg DO


__________________________________________________





[facility]


Service


[facility]


Primary Care Physician


Jamey Greenberg DO


Admission Diagnosis





Ulcerative colitis exacerbation, rule out appendicitis, elevated srinivas


Diagnoses:  


Chief Complaint:  


Abdominal pain diarrhea fever


Travel History


International Travel<30 Days:  No


Contact w/Intl Traveler <30 Da:  No


Traveled to Known Affected Are:  No





Sepsis Criteria


SIRS Criteria (2 or more):  Heart rate over 90


Sepsis Criteria (SIRS+source):  Infect source susp/known


History of Present Illness


This patient is a 53-year-old female with a history of ulcerative colitis which 

has been quite stable for the last 2 years.  Over the last 2 weeks she has had 

increased nausea and vomiting with increased diarrhea and abdominal pain which 

has been severe.  She saw her primary care provider and did take metronidazole 

and a steroid taper with no improvement.  She came to the emergency room for 

further evaluation was found to have tachycardia with abdominal tenderness and 

hypoxemia on exam.  Patient says her belly hurts when she takes a good breath 

but she also is congested on exam.  She does have a history of continued 

tobacco use.  Indicates a fever at home was 101.  She has lost about 20 pounds 

over the last 3 weeks because she could not eat and because of persistent 

diarrhea.  Diarrhea is bloody with mucus.  Here she has had a CT abdomen pelvis 

which is concerning for inflammatory changes in the appendix area.  Patient is 

also quite hypoxemic at 89% with a respiratory rate on my exam at 22.  She is 

recommended for further evaluation and treatment with IV antibiotics and 

bronchodilators due to acute respiratory failure and failed outpatient 

treatment of abdominal infection.  Abdominal pain is improved with IV 

hydromorphone





Review of Systems


Constitutional:  COMPLAINS OF: Fatigue, Fever, Weight loss, Change in appetite, 

DENIES: Diaphoretic episodes, Weight gain, Chills, Dizziness, Night Sweats


Endocrine:  DENIES: Abnorml menstrual pattern, Heat/cold intolerance, Polydipsia

, Polyuria, Polyphagia


Eyes:  DENIES: Blurred vision, Diplopia, Eye inflammation, Eye pain, Vision loss

, Photosensitivity, Double Vision


Ears, nose, mouth, throat:  DENIES: Tinnitus, Hearing loss, Vertigo, Nasal 

discharge, Oral lesions, Throat pain, Hoarseness, Ear Pain, Running Nose, 

Epistaxis, Sinus Pain, Toothache, Odynophagia


Respiratory:  DENIES: Apneas, Cough, Snoring, Wheezing, Hemoptysis, Sputum 

production, Shortness of breath


Cardiovascular:  DENIES: Chest pain, Palpitations, Syncope, Dyspnea on Exertion

, PND, Lower Extremity Edema, Orthopnea, Claudication


Gastrointestinal:  COMPLAINS OF: Abdominal pain, Bloody stools, Diarrhea, Nausea

, Vomiting, DENIES: Black stools, Constipation, Difficulty Swallowing, Anorexia


Genitourinary:  DENIES: Abnormal vaginal bleeding, Dysmenorrhea, Dyspareunia, 

Sexual dysfunction, Urinary frequency, Urinary incontinence, Urgency, Hematuria

, Dysuria, Nocturia, Vaginal discharge


Musculoskeletal:  DENIES: Joint pain, Muscle aches, Stiffness, Joint Swelling, 

Back pain, Neck pain


Integumentary:  DENIES: Abnormal pigmentation, Pruritus, Rash, Nail changes, 

Breast masses, Breast skin changes, Nipple discharge


Hematologic/lymphatic:  DENIES: Bruising, Lymphadenopathy


Immunologic/allergic:  DENIES: Eczema, Urticaria


Neurologic:  DENIES: Abnormal gait, Headache, Localized weakness, Paresthesias, 

Seizures, Speech Problems, Tremor, Poor Balance


Psychiatric:  DENIES: Anxiety, Confusion, Mood changes, Depression, 

Hallucinations, Agitation, Suicidal Ideation, Homicidal Ideation, Delusions


Except as stated in HPI:  all other systems reviewed are Neg





Past Family Social History


Past Medical History


COPD


Ulcerative colitis


Chronic depression


Past Surgical History


Hernia repair


 2


Sinus surgery


Reported Medications


Reviewed in the EMR, recent prescription for metronidazole and steroid taper 

per her primary care


Allergies:  


Coded Allergies:  


     codeine (Unverified  Allergy, Severe, INCREASED LIVER ENZYMES, 18)


     meperidine (Unverified  Allergy, Severe, UNKNOWN, 18)


     acetaminophen (Unverified  Adverse Reaction, Severe, LIVER PROBLEMS, 18

)


     morphine (Unverified  Adverse Reaction, Severe, LIVER PROBLEMS, 18)


Active Ordered Medications


Reviewed in the EMR


Family History


Mother  from colitis and 80, father  from lung cancer at 73


Social History


Patient does smoke a pack a day for the last 30 years


No alcohol dependency





Physical Exam


Vital Signs





Vital Signs








  Date Time  Temp Pulse Resp B/P (MAP) Pulse Ox O2 Delivery O2 Flow Rate FiO2


 


18 13:43   16     


 


18 13:26  86 16 113/65 (81) 95 Nasal Cannula 4.00 


 


18 12:26     95 Nasal Cannula 4.00 


 


18 12:15   18  81 Nasal Cannula 4.00 


 


18 10:14   18     


 


18 09:24 98.8 116 16 138/73 (94) 95   








Physical Exam


GENERAL: This is a well-nourished, patient is complaining of abdominal pain and 

is short of breath


SKIN: No rashes, ecchymoses or lesions. Cool and dry.


HEAD: Atraumatic. Normocephalic. No temporal or scalp tenderness.


EYES: Pupils equal round and reactive. Extraocular motions intact. No scleral 

icterus. No injection or drainage. 


ENT: Nose without bleeding, purulent drainage or septal hematoma. Throat 

without erythema, tonsillar hypertrophy or exudate. Uvula midline. Airway 

patent.


NECK: Trachea midline. No JVD or lymphadenopathy. Supple, nontender, no 

meningeal signs.


CARDIOVASCULAR: Regular rate and rhythm without murmurs, gallops, or rubs. 


RESPIRATORY: Bilateral scattered rhonchi and wheezes


GASTROINTESTINAL: Abdomen soft, moderately tender with hypoactive bowel sounds r

, nondistended. No hepato-splenomegaly, or palpable masses. No guarding.


MUSCULOSKELETAL: Extremities without clubbing, cyanosis, or edema. No joint 

tenderness, effusion, or edema noted. No calf tenderness. Negative Homans sign 

bilaterally.


NEUROLOGICAL: Awake and alert. Cranial nerves II through XII intact.  Motor and 

sensory grossly within normal limits. Five out of 5 muscle strength in all 

muscle groups.  Normal speech.


Laboratory





Laboratory Tests








Test


  18


11:31


 


White Blood Count 8.0 


 


Red Blood Count 4.02 


 


Hemoglobin 14.1 


 


Hematocrit 41.0 


 


Mean Corpuscular Volume 101.9 


 


Mean Corpuscular Hemoglobin 35.0 


 


Mean Corpuscular Hemoglobin


Concent 34.3 


 


 


Red Cell Distribution Width 16.3 


 


Platelet Count 203 


 


Mean Platelet Volume 8.6 


 


Neutrophils (%) (Auto) 75.3 


 


Lymphocytes (%) (Auto) 14.3 


 


Monocytes (%) (Auto) 9.9 


 


Eosinophils (%) (Auto) 0.2 


 


Basophils (%) (Auto) 0.3 


 


Neutrophils # (Auto) 6.1 


 


Lymphocytes # (Auto) 1.1 


 


Monocytes # (Auto) 0.8 


 


Eosinophils # (Auto) 0.0 


 


Basophils # (Auto) 0.0 


 


CBC Comment DIFF FINAL 


 


Differential Comment  


 


Blood Urea Nitrogen 7 


 


Creatinine 0.54 


 


Random Glucose 125 


 


Total Protein 6.4 


 


Albumin 3.2 


 


Calcium Level 8.4 


 


Alkaline Phosphatase 524 


 


Aspartate Amino Transf


(AST/SGOT) 200 


 


 


Alanine Aminotransferase


(ALT/SGPT) 99 


 


 


Total Bilirubin 3.2 


 


Sodium Level 133 


 


Potassium Level 2.7 


 


Chloride Level 89 


 


Carbon Dioxide Level 32.2 


 


Anion Gap 12 


 


Estimat Glomerular Filtration


Rate 118 


 


 


Lactic Acid Level 2.3 


 


Lipase 84 








Result Diagram:  


18 1131                                                                    

            18 1131





Imaging





Last Impressions








Chest X-Ray 18 1119 Signed





Impressions: 





 Service Date/Time:  2018 11:28 - CONCLUSION:  No acute 





 cardiopulmonary process to explain current clinical symptoms.     Malachi Smith MD 


 


Abdomen/Pelvis CT 18 1119 Signed





Impressions: 





 Service Date/Time:  2018 12:37 - CONCLUSION:  1. Acute on 





 chronic appendicitis suspected in the proper clinical setting; there is an 





 indurated appearing fluid-filled viscus that appears to be blind ending 





 posteromedial to the cecum. No free air seen. 2. Enlarged and severely fatty 





 infiltrated liver. 3. Right ovarian cyst.     Familia Mcgrath MD ADDENDUM:   





 Apparently patient has a normal white count and does not have other clinical 





 features typical for appendicitis. In looking the study over again, there is 





 potentially a portion of a normal appendix seen inferior to the cecum on 

series 





 601 image 68 which would also mitigate against appendicitis. The blind ending 





 pouch posteromedial to the cecum is best nonspecific but I believe abnormal, 





 could be a Meckel's diverticulum. There definitely seems to be some 

inflammatory 





 change of it and the adjacent distal ileum, series 601 image 96. Crohn's 

disease 





 with an adjacent abscess would also be conceivable but unlikely in the setting 





 of a normal white count.   Familia Mcgrath MD 











Assessment and Plan


Problem List:  


(1) Abdominal pain


ICD Code:  R10.9 - Unspecified abdominal pain


Status:  Acute


Plan:  This may be multifactorial due to acute appendicitis versus ulcerative 

colitis flare


Continue with IV antibiotics empirically, IV metronidazole and Cipro


Continue with IV narcotics for pain, IV hydromorphone


General surgery and GI consult pending


Check C. difficile





(2) Ulcerative colitis


ICD Code:  K51.90 - Ulcerative colitis, unspecified, without complications


Status:  Acute


Plan:  We will continue with IV steroids and antibiotics for now and follow-up 

clinically with GI consultation


Patient has not had a problem in over 10 years





(3) COPD with acute exacerbation


ICD Code:  J44.1 - Chronic obstructive pulmonary disease with (acute) 

exacerbation


Status:  Acute


Plan:  With acute exacerbation patient will need nebulized bronchodilators will 

schedule it as needed


IV steroids and IV antibiotics


We will follow AB








Physician Certification


2 Midnight Certification Type:  Admission for Inpatient Services


Order for Inpatient Services


The services are ordered in accordance with Medicare regulations or non-

Medicare payer requirements, as applicable.  In the case of services not 

specified as inpatient-only, they are appropriately provided as inpatient 

services in accordance with the 2-midnight benchmark.


Estimated LOS (days):  3


3 days is the estimated time the patient will need to remain in the hospital, 

assuming treatment plan goals are met and no additional complications.


Post-Hospital Plan:  Home





Problem Qualifiers





(1) Abdominal pain:  


Qualified Codes:  R10.31 - Right lower quadrant pain


(2) Ulcerative colitis:  


Qualified Codes:  K51.919 - Ulcerative colitis, unspecified with unspecified 

complications








Elin Vasquez MD 2018 15:08

## 2018-04-07 VITALS
RESPIRATION RATE: 20 BRPM | OXYGEN SATURATION: 94 % | SYSTOLIC BLOOD PRESSURE: 118 MMHG | TEMPERATURE: 97.7 F | DIASTOLIC BLOOD PRESSURE: 57 MMHG | HEART RATE: 76 BPM

## 2018-04-07 VITALS
TEMPERATURE: 97.5 F | DIASTOLIC BLOOD PRESSURE: 56 MMHG | OXYGEN SATURATION: 95 % | SYSTOLIC BLOOD PRESSURE: 125 MMHG | RESPIRATION RATE: 18 BRPM | HEART RATE: 72 BPM

## 2018-04-07 VITALS
DIASTOLIC BLOOD PRESSURE: 59 MMHG | TEMPERATURE: 97.9 F | HEART RATE: 80 BPM | RESPIRATION RATE: 18 BRPM | SYSTOLIC BLOOD PRESSURE: 100 MMHG | OXYGEN SATURATION: 94 %

## 2018-04-07 VITALS — DIASTOLIC BLOOD PRESSURE: 56 MMHG | SYSTOLIC BLOOD PRESSURE: 96 MMHG

## 2018-04-07 VITALS — OXYGEN SATURATION: 95 %

## 2018-04-07 VITALS
SYSTOLIC BLOOD PRESSURE: 92 MMHG | TEMPERATURE: 97.9 F | HEART RATE: 86 BPM | OXYGEN SATURATION: 94 % | DIASTOLIC BLOOD PRESSURE: 56 MMHG | RESPIRATION RATE: 18 BRPM

## 2018-04-07 VITALS — OXYGEN SATURATION: 93 %

## 2018-04-07 LAB
% SATURATION IRON PROFILE: 92.5 % (ref 20–50)
ALBUMIN SERPL-MCNC: 2.6 GM/DL (ref 3.4–5)
ALP SERPL-CCNC: 377 U/L (ref 45–117)
ALT SERPL-CCNC: 78 U/L (ref 10–53)
AST SERPL-CCNC: 173 U/L (ref 15–37)
BASOPHILS # BLD AUTO: 0 TH/MM3 (ref 0–0.2)
BASOPHILS NFR BLD: 0.1 % (ref 0–2)
BILIRUB INDIRECT SERPL-MCNC: 0.8 MG/DL (ref 0–0.8)
BILIRUB SERPL-MCNC: 2.6 MG/DL (ref 0.2–1)
BUN SERPL-MCNC: 3 MG/DL (ref 7–18)
CALCIUM SERPL-MCNC: 7.1 MG/DL (ref 8.5–10.1)
CALCIUM TP COR SERPL-MCNC: 8.1 MG/DL (ref 8.5–10.1)
CHLORIDE SERPL-SCNC: 94 MEQ/L (ref 98–107)
CREAT SERPL-MCNC: 0.43 MG/DL (ref 0.5–1)
DIRECT BILIRUBIN ADULT: 1.8 MG/DL (ref 0–0.2)
EOSINOPHIL # BLD: 0 TH/MM3 (ref 0–0.4)
EOSINOPHIL NFR BLD: 0 % (ref 0–4)
ERYTHROCYTE [DISTWIDTH] IN BLOOD BY AUTOMATED COUNT: 15.8 % (ref 11.6–17.2)
FERRITIN SERPL-MCNC: 1760 NG/ML (ref 8–252)
GFR SERPLBLD BASED ON 1.73 SQ M-ARVRAT: 154 ML/MIN (ref 89–?)
GLUCOSE SERPL-MCNC: 211 MG/DL (ref 74–106)
HCO3 BLD-SCNC: 31.7 MEQ/L (ref 21–32)
HCT VFR BLD CALC: 34.8 % (ref 35–46)
HGB BLD-MCNC: 12.1 GM/DL (ref 11.6–15.3)
IRON (FE): 101 MCG/DL (ref 50–170)
LYMPHOCYTES # BLD AUTO: 0.4 TH/MM3 (ref 1–4.8)
LYMPHOCYTES NFR BLD AUTO: 9.5 % (ref 9–44)
MCH RBC QN AUTO: 35.7 PG (ref 27–34)
MCHC RBC AUTO-ENTMCNC: 34.7 % (ref 32–36)
MCV RBC AUTO: 102.8 FL (ref 80–100)
MONOCYTE #: 0.2 TH/MM3 (ref 0–0.9)
MONOCYTES NFR BLD: 4.5 % (ref 0–8)
NEUTROPHILS # BLD AUTO: 3.6 TH/MM3 (ref 1.8–7.7)
NEUTROPHILS NFR BLD AUTO: 85.9 % (ref 16–70)
PLATELET # BLD: 136 TH/MM3 (ref 150–450)
PMV BLD AUTO: 9.3 FL (ref 7–11)
PROT SERPL-MCNC: 5.2 GM/DL (ref 6.4–8.2)
PROT SERPL-MCNC: 5.3 GM/DL (ref 6.4–8.2)
RBC # BLD AUTO: 3.39 MIL/MM3 (ref 4–5.3)
SODIUM SERPL-SCNC: 134 MEQ/L (ref 136–145)
TIBC SERPL-MCNC: 109 MCG/DL (ref 250–450)
WBC # BLD AUTO: 4.2 TH/MM3 (ref 4–11)

## 2018-04-07 RX ADMIN — CIPROFLOXACIN SCH MLS/HR: 2 INJECTION, SOLUTION INTRAVENOUS at 00:33

## 2018-04-07 RX ADMIN — Medication SCH ML: at 20:04

## 2018-04-07 RX ADMIN — SODIUM CHLORIDE SCH MLS/HR: 900 INJECTION, SOLUTION INTRAVENOUS at 20:04

## 2018-04-07 RX ADMIN — IPRATROPIUM BROMIDE AND ALBUTEROL SULFATE SCH AMPULE: .5; 3 SOLUTION RESPIRATORY (INHALATION) at 07:48

## 2018-04-07 RX ADMIN — ZOLPIDEM TARTRATE PRN MG: 10 TABLET, FILM COATED ORAL at 23:36

## 2018-04-07 RX ADMIN — IPRATROPIUM BROMIDE AND ALBUTEROL SULFATE SCH AMPULE: .5; 3 SOLUTION RESPIRATORY (INHALATION) at 11:59

## 2018-04-07 RX ADMIN — PANTOPRAZOLE SODIUM SCH MG: 40 INJECTION, POWDER, FOR SOLUTION INTRAVENOUS at 05:19

## 2018-04-07 RX ADMIN — METHYLPREDNISOLONE SODIUM SUCCINATE SCH MG: 40 INJECTION, POWDER, FOR SOLUTION INTRAMUSCULAR; INTRAVENOUS at 20:04

## 2018-04-07 RX ADMIN — CIPROFLOXACIN SCH MLS/HR: 2 INJECTION, SOLUTION INTRAVENOUS at 13:00

## 2018-04-07 RX ADMIN — HYDROMORPHONE HYDROCHLORIDE PRN MG: 2 INJECTION INTRAMUSCULAR; INTRAVENOUS; SUBCUTANEOUS at 05:17

## 2018-04-07 RX ADMIN — HYDROMORPHONE HYDROCHLORIDE PRN MG: 2 INJECTION INTRAMUSCULAR; INTRAVENOUS; SUBCUTANEOUS at 00:32

## 2018-04-07 RX ADMIN — POTASSIUM CHLORIDE, DEXTROSE MONOHYDRATE AND SODIUM CHLORIDE SCH MLS/HR: 150; 5; 450 INJECTION, SOLUTION INTRAVENOUS at 15:57

## 2018-04-07 RX ADMIN — PANTOPRAZOLE SODIUM SCH MG: 40 INJECTION, POWDER, FOR SOLUTION INTRAVENOUS at 17:00

## 2018-04-07 RX ADMIN — PHENYTOIN SODIUM SCH MLS/HR: 50 INJECTION INTRAMUSCULAR; INTRAVENOUS at 20:04

## 2018-04-07 RX ADMIN — SODIUM CHLORIDE SCH MLS/HR: 900 INJECTION, SOLUTION INTRAVENOUS at 14:00

## 2018-04-07 RX ADMIN — IPRATROPIUM BROMIDE AND ALBUTEROL SULFATE SCH AMPULE: .5; 3 SOLUTION RESPIRATORY (INHALATION) at 19:57

## 2018-04-07 RX ADMIN — PHENYTOIN SODIUM SCH MLS/HR: 50 INJECTION INTRAMUSCULAR; INTRAVENOUS at 10:33

## 2018-04-07 RX ADMIN — POTASSIUM CHLORIDE, DEXTROSE MONOHYDRATE AND SODIUM CHLORIDE SCH MLS/HR: 150; 5; 450 INJECTION, SOLUTION INTRAVENOUS at 05:17

## 2018-04-07 RX ADMIN — ONDANSETRON PRN MG: 2 INJECTION, SOLUTION INTRAMUSCULAR; INTRAVENOUS at 04:39

## 2018-04-07 RX ADMIN — Medication SCH ML: at 09:00

## 2018-04-07 RX ADMIN — SODIUM CHLORIDE SCH MLS/HR: 900 INJECTION, SOLUTION INTRAVENOUS at 05:18

## 2018-04-07 RX ADMIN — ESCITALOPRAM OXALATE SCH MG: 10 TABLET, FILM COATED ORAL at 09:22

## 2018-04-07 RX ADMIN — METHYLPREDNISOLONE SODIUM SUCCINATE SCH MG: 40 INJECTION, POWDER, FOR SOLUTION INTRAMUSCULAR; INTRAVENOUS at 09:00

## 2018-04-07 NOTE — PD.CONS
__________________________________________________





HPI


Service


General Surgery


Consult Requested By


Dr. Dillard


Reason for Consult


intraabdominal abscess


Primary Care Physician


Jamey Greenberg, DO


History of Present Illness


53-year-old female with history of "colitis" presented with 3 week history of 

inability to tolerate oral intake, vomiting, mucousy watery and bloody 

diarrhea.  She also complains of some diffuse pain.  She states she has lost 22 

pounds in the last 3 weeks.  She has a history of open cholecystectomy and 

reports that she was told her appendix was removed at that time.  On 

presentation to the emergency department she was noted to have a normal white 

blood count.  LFTs are all elevated, bilirubin and alkaline phosphatase higher 

than transaminases.  She has a history of mild elevation of LFTs but not this 

high.  She has been told that she has an enlarged liver in the past.  The 

patient has no idea what kind of colitis she has.  She does not recognize any 

of the medication names for inflammatory bowel disease.  She says she has never 

been diagnosed by a gastroenterologist.  She has had colonoscopies before but 

cannot remember when her last one was done.  A CT of the abdomen and pelvis 

noted an intra-abdominal abscess.  Initially this was felt to be a 

periappendiceal abscess, however, after discussion with radiology this may more 

likely be some other etiology.





Review of Systems


Constitutional:  COMPLAINS OF: Weight loss, DENIES: Fever, Chills


Eyes:  DENIES: Eye inflammation, Eye pain


Ears, nose, mouth, throat:  DENIES: Oral lesions, Throat pain


Respiratory:  DENIES: Cough, Shortness of breath


Cardiovascular:  DENIES: Chest pain, Palpitations


Gastrointestinal:  COMPLAINS OF: Abdominal pain, Bloody stools, Diarrhea, Nausea

, Vomiting, Anorexia


Musculoskeletal:  DENIES: Back pain, Neck pain


Integumentary:  DENIES: Pruritus, Rash


Neurologic:  DENIES: Paresthesias, Seizures





Past Family Social History


Past Medical History


Hepatomegaly


Past Surgical History


Open cholecystectomy and appendectomy


Ventral hernia repair at cholecystectomy incision site


 via lower midline


Reported Medications





Reported Meds & Active Scripts


Active


Reported


Prednisone 20 Mg Tab 20 Mg PO AS DIRECTED


     40 MG twice a day x 3 days, then 20 MG daily x 3 days, then 10 MG


     daily x 3 days


Hyoscyamine ER 12 HR (Hyoscyamine Sulfate) 0.375 Ines 0.375 Mg PO BID


Ambien (Zolpidem Tartrate) 10 Mg Tab 10 Mg PO HS PRN


Flagyl (Metronidazole) 500 Mg Tab 500 Mg PO Q8HR


Escitalopram (Escitalopram Oxalate) 10 Mg Tab 10 Mg PO DAILY


Allergies:  


Coded Allergies:  


     codeine (Unverified  Allergy, Severe, INCREASED LIVER ENZYMES, 18)


     meperidine (Unverified  Allergy, Severe, UNKNOWN, 18)


     acetaminophen (Unverified  Adverse Reaction, Severe, LIVER PROBLEMS, 18

)


     morphine (Unverified  Adverse Reaction, Severe, LIVER PROBLEMS, 18)


Active Ordered Medications





Current Medications








 Medications


  (Trade)  Dose


 Ordered  Sig/Sahil


 Route  Start Time


 Stop Time Status Last Admin


 


  (NS Flush)  2 ml  UNSCH  PRN


 IV FLUSH  18 11:30


    18 18:49


 


 


 Sodium Chloride  1,000 ml @ 


 100 mls/hr  Q10H


 IV  18 14:33


    18 15:44


 


 


  (NS Flush)  2 ml  UNSCH  PRN


 IV FLUSH  18 14:45


     


 


 


  (NS Flush)  2 ml  BID


 IV FLUSH  18 21:00


    18 23:10


 


 


  (Tylenol)  650 mg  Q4H  PRN


 PO  18 14:45


     


 


 


  (Zofran Inj)  4 mg  Q6H  PRN


 IVP  18 14:45


    18 04:39


 


 


  (Narcan Inj)  0.4 mg  UNSCH  PRN


 IV PUSH  18 14:45


     


 


 


 Ciprofloxacin/


 Dextrose  200 ml @ 


 200 mls/hr  Q12H


 IV  18 01:00


    18 00:33


 


 


 Metronidazole  100 ml @ 


 100 mls/hr  Q8HR


 IV  18 22:00


    18 05:18


 


 


  (Dilaudid Pf Inj)  1 mg  Q4H  PRN


 IV PUSH  18 14:45


    18 05:17


 


 


  (Duoneb Neb)  1 ampule  Q6HR WHILE AWAKE  NEB


 NEB  18 20:00


    18 07:48


 


 


  (Duoneb Neb)  1 ampule  Q2HR NEB  PRN


 NEB  18 15:15


     


 


 


  (SoluMEDROL INJ)  40 mg  Q12HR


 IV PUSH  18 21:00


    18 20:03


 


 


  (Protonix Inj)  40 mg  Q12H


 IV PUSH  18 17:00


    18 05:19


 


 


 Potassium


 Chloride/Dextrose/


 Sod Cl  1,000 ml @ 


 100 mls/hr  Q10H


 IV  18 20:45


    18 05:17


 


 


  (Lexapro)  10 mg  DAILY


 PO  18 09:00


    18 09:22


 


 


  (Ambien)  10 mg  HS  PRN


 PO  18 22:45


    18 23:05


 








Family History


Noncontributory


Social History


She drinks a couple of vodka drinks a week.  Smokes 1-2 cigarettes daily.





Physical Exam


Vital Signs





Vital Signs








  Date Time  Temp Pulse Resp B/P (MAP) Pulse Ox O2 Delivery O2 Flow Rate FiO2


 


18 08:00 97.5 72 18 125/56 (79) 95   


 


18 07:51     95   


 


18 22:35 97.5 74 20 131/62 (85) 98   


 


18 20:05 98.5 79 16 102/64 (77) 96 Nasal Cannula 2.00 


 


18 19:37     96 Nasal Cannula 2.00 


 


18 15:57   18  94 Nasal Cannula 2.00 


 


18 15:38  92 18 123/62 (82) 96 Nasal Cannula 3.00 


 


18 13:43   16     


 


18 13:26  86 16 113/65 (81) 95 Nasal Cannula 4.00 


 


18 12:26     95 Nasal Cannula 4.00 


 


18 12:15   18  81 Nasal Cannula 4.00 


 


18 10:14   18     








Physical Exam


GENERAL: Awake and alert.  No acute distress.  Cooperative.  Sitting up in bed 

and appears comfortable.


HEAD: Normocephalic.  Atraumatic.


EYES:  Pupils equal round and reactive to light bilaterally.  No scleral 

icterus.


ENT: Moist oral mucosa.


NECK: Trachea midline.


CHEST: Nonlabored breathing.  No respiratory distress.


CARDIOVASCULAR:  Regular rate and rhythm.


ABDOMEN: Large right subcostal scar.  Lower midline scar.  Mild distention.  No 

rebound or guarding.  Mild diffuse tenderness.


EXTREMITIES: No cyanosis or edema.


SKIN: Warm, dry, nonjaundiced.


Laboratory





Laboratory Tests








Test


  18


11:31 18


15:20 18


05:47 18


06:06


 


White Blood Count 8.0   4.2  


 


Red Blood Count 4.02   3.39  


 


Hemoglobin 14.1   12.1  


 


Hematocrit 41.0   34.8  


 


Mean Corpuscular Volume 101.9   102.8  


 


Mean Corpuscular Hemoglobin 35.0   35.7  


 


Mean Corpuscular Hemoglobin


Concent 34.3 


  


  34.7 


  


 


 


Red Cell Distribution Width 16.3   15.8  


 


Platelet Count 203   136  


 


Mean Platelet Volume 8.6   9.3  


 


Neutrophils (%) (Auto) 75.3   85.9  


 


Lymphocytes (%) (Auto) 14.3   9.5  


 


Monocytes (%) (Auto) 9.9   4.5  


 


Eosinophils (%) (Auto) 0.2   0.0  


 


Basophils (%) (Auto) 0.3   0.1  


 


Neutrophils # (Auto) 6.1   3.6  


 


Lymphocytes # (Auto) 1.1   0.4  


 


Monocytes # (Auto) 0.8   0.2  


 


Eosinophils # (Auto) 0.0   0.0  


 


Basophils # (Auto) 0.0   0.0  


 


CBC Comment DIFF FINAL   DIFF FINAL  


 


Differential Comment      


 


Blood Urea Nitrogen 7    3 


 


Creatinine 0.54    0.43 


 


Random Glucose 125    211 


 


Total Protein 6.4    5.2 


 


Albumin 3.2    


 


Calcium Level 8.4    7.1 


 


Alkaline Phosphatase 524    


 


Aspartate Amino Transf


(AST/SGOT) 200 


  


  


  


 


 


Alanine Aminotransferase


(ALT/SGPT) 99 


  


  


  


 


 


Total Bilirubin 3.2    


 


Sodium Level 133    134 


 


Potassium Level 2.7    3.5 


 


Chloride Level 89    94 


 


Carbon Dioxide Level 32.2    31.7 


 


Anion Gap 12    8 


 


Estimat Glomerular Filtration


Rate 118 


  


  


  154 


 


 


Lactic Acid Level 2.3    


 


Lipase 84    


 


Blood Gas Patient Temperature  37.0   


 


Blood Gas HCO3  30   


 


Blood Gas Base Excess  5.6   


 


Blood Gas Oxygen Saturation  82   


 


Arterial Blood pH  7.41   


 


Arterial Blood Partial


Pressure CO2 


  49 


  


  


 


 


Arterial Blood Partial


Pressure O2 


  52 


  


  


 


 


Arterial Blood Oxygen Content  15.6   


 


Arterial Blood


Carboxyhemoglobin 


  2.3 


  


  


 


 


Arterial Blood Methemoglobin  1.1   


 


Blood Gas Hemoglobin  13.6   


 


Oxygen Delivery Device  ROOM AIR   


 


Blood Gas Inspired Oxygen  21   


 


Protein Corrected Calcium    8.1 








Result Diagram:  


18 0547                                                                    

            18 0606





Imaging





Last Impressions








Chest X-Ray 18 1119 Signed





Impressions: 





 Service Date/Time:  2018 11:28 - CONCLUSION:  No acute 





 cardiopulmonary process to explain current clinical symptoms.     Malachi Smith MD 


 


Abdomen/Pelvis CT 18 1119 Signed





Impressions: 





 Service Date/Time:  2018 12:37 - CONCLUSION:  1. Acute on 





 chronic appendicitis suspected in the proper clinical setting; there is an 





 indurated appearing fluid-filled viscus that appears to be blind ending 





 posteromedial to the cecum. No free air seen. 2. Enlarged and severely fatty 





 infiltrated liver. 3. Right ovarian cyst.     Familia Mcgrath MD ADDENDUM:   





 Apparently patient has a normal white count and does not have other clinical 





 features typical for appendicitis. In looking the study over again, there is 





 potentially a portion of a normal appendix seen inferior to the cecum on 

series 





 601 image 68 which would also mitigate against appendicitis. The blind ending 





 pouch posteromedial to the cecum is best nonspecific but I believe abnormal, 





 could be a Meckel's diverticulum. There definitely seems to be some 

inflammatory 





 change of it and the adjacent distal ileum, series 601 image 96. Crohn's 

disease 





 with an adjacent abscess would also be conceivable but unlikely in the setting 





 of a normal white count.   Familia Mcgrath MD 











Assessment and Plan


Assessment and Plan


Elevated bilirubin and alk phos- unknown etiology.  Will order MRCP.





Mucus bloody diarrhea-  Await GI eval.  C dif pending. Ordered stool ova, 

parasites, entovirus. 





Intrabdominal abscess- She states she has had appendectomy.  I reviewed the CT 

images.  I discussed the case with Dr. Mcgrath of radiology.  It appears there 

may be a normal appendix present and this is more likely another etiology which 

could include an abscess associated with Crohn's, Meckel's diverticulitis, or 

possibly a diverticulum of the cecum or small bowel which is not inflamed.  She 

is not acutely ill requiring emergency surgery and I recommend IV antibiotics 

at this time.  We will consider repeating a CT scan in a few days.  Protonix 

twice daily in case this is a Meckel's diverticulum which is bleeding.











Bradley Strauss MD 2018 10:26

## 2018-04-07 NOTE — HHI.PR
Subjective


Remarks


53 YOWF with COPD, SOB,Diverticulitis


Seen by surgery


Breathing better


Did't go for CTA





Objective


Vital Signs





Vital Signs








  Date Time  Temp Pulse Resp B/P (MAP) Pulse Ox O2 Delivery O2 Flow Rate FiO2


 


4/7/18 16:00 97.9 80 18 100/59 (73) 94   


 


4/7/18 12:00 97.7 76 20 118/57 (77) 94   


 


4/7/18 08:00 97.5 72 18 125/56 (79) 95   


 


4/7/18 07:51     95   


 


4/6/18 22:35 97.5 74 20 131/62 (85) 98   


 


4/6/18 20:05 98.5 79 16 102/64 (77) 96 Nasal Cannula 2.00 


 


4/6/18 19:37     96 Nasal Cannula 2.00 














I/O      


 


 4/6/18 4/6/18 4/6/18 4/7/18 4/7/18 4/7/18





 07:00 15:00 23:00 07:00 15:00 23:00


 


Intake Total  1200 ml 100 ml 1400 ml 0 ml 


 


Balance  1200 ml 100 ml 1400 ml 0 ml 


 


      


 


Intake IV Total  1200 ml 100 ml 1400 ml 0 ml 


 


# Voids    3  


 


# Bowel Movements    1  








Result Diagram:  


4/7/18 0547                                                                    

            4/7/18 0606





Objective Remarks


GENERAL: MBMN WF,NAD


SKIN: Warm and dry.


HEAD: Normocephalic.


EYES: No scleral icterus. No injection or drainage. 


NECK: Supple, trachea midline. No JVD or lymphadenopathy.


CARDIOVASCULAR: Regular rate and rhythm without murmurs, gallops, or rubs. 


RESPIRATORY: Breath sounds equal bilaterally. No accessory muscle use.


GASTROINTESTINAL: Abdomen soft, non-tender, nondistended. 


MUSCULOSKELETAL: No cyanosis, or edema. 


BACK: Nontender without obvious deformity. No CVA tenderness.








A/P


Assessment and Plan


IMPRESSION:


1.  Shortness of Breath and Hypoxia, improving


2.  Chronic obstructive pulmonary disease exacerbation.


3.  Diverticulitis.


4.  Anxiety and depression.


 





PLAN:





Aerosol nebs


Cont Abx


Supplement 02


Surgery following


DW Pt and her .











Sonny Nagy MD Apr 7, 2018 17:50

## 2018-04-07 NOTE — PD.CONS
HPI


History of Present Illness


This is a 53 year old of UC, pt not certain what type of colitis, was diagnosed 

many yrs ago, colonoscopy was done many yrs ago,  hasn't been on tx for this, 

hasn't been following with GI, she presented to the ED with few weeks hx of 

nausea, vomiting, bloody and mucusy diarrhea.She states she has lost 22 pounds 

since.  She has a history of open cholecystectomy and reports that she was told 

her appendix was removed at that time.  LFTs are all elevated, bilirubin and 

alkaline phosphatase higher than transaminases.  She has a history of mild 

elevation of LFTs but not this high.  She has been told that she has an 

enlarged liver in the past. She is going for MRCP soon.   A CT of the abdomen 

and pelvis noted an intra-abdominal abscess.  Initially this was felt to be a 

periappendiceal abscess, however, delayed imaging showed that questionable area 

on previous imaging represents decompressed cecum.  Differential  could include 

an abscess associated with Crohn's, Meckel's diverticulitis, or possibly a 

diverticulum of the cecum. GS on the case recommended medical management for 

now. 


 (Vineet Hays)





PFSH


Past Medical History


COPD


Ulcerative colitis


Chronic depression


Past Surgical History


Hernia repair


 2


Sinus surgery


 (Vineet Hays)


Coded Allergies:  


     codeine (Unverified  Allergy, Severe, INCREASED LIVER ENZYMES, 18)


     meperidine (Unverified  Allergy, Severe, UNKNOWN, 18)


     acetaminophen (Unverified  Adverse Reaction, Severe, LIVER PROBLEMS, 18

)


     morphine (Unverified  Adverse Reaction, Severe, LIVER PROBLEMS, 18)


Medications





Current Medications








 Medications


  (Trade)  Dose


 Ordered  Sig/Sahil


 Route  Start Time


 Stop Time Status Last Admin


 


  (NS Flush)  2 ml  UNSCH  PRN


 IV FLUSH  18 11:30


    18 18:49


 


 


 Sodium Chloride  1,000 ml @ 


 100 mls/hr  Q10H


 IV  18 14:33


    18 15:44


 


 


  (NS Flush)  2 ml  UNSCH  PRN


 IV FLUSH  18 14:45


     


 


 


  (NS Flush)  2 ml  BID


 IV FLUSH  18 21:00


    18 23:10


 


 


  (Tylenol)  650 mg  Q4H  PRN


 PO  18 14:45


     


 


 


  (Zofran Inj)  4 mg  Q6H  PRN


 IVP  18 14:45


    18 04:39


 


 


  (Narcan Inj)  0.4 mg  UNSCH  PRN


 IV PUSH  18 14:45


     


 


 


 Ciprofloxacin/


 Dextrose  200 ml @ 


 200 mls/hr  Q12H


 IV  18 01:00


    18 13:00


 


 


 Metronidazole  100 ml @ 


 100 mls/hr  Q8HR


 IV  18 22:00


    18 05:18


 


 


  (Dilaudid Pf Inj)  1 mg  Q4H  PRN


 IV PUSH  18 14:45


    18 05:17


 


 


  (Duoneb Neb)  1 ampule  Q6HR WHILE AWAKE  NEB


 NEB  18 20:00


    18 11:59


 


 


  (Duoneb Neb)  1 ampule  Q2HR NEB  PRN


 NEB  18 15:15


     


 


 


  (SoluMEDROL INJ)  40 mg  Q12HR


 IV PUSH  18 21:00


    18 09:00


 


 


  (Protonix Inj)  40 mg  Q12H


 IV PUSH  18 17:00


    18 05:19


 


 


 Potassium


 Chloride/Dextrose/


 Sod Cl  1,000 ml @ 


 100 mls/hr  Q10H


 IV  18 20:45


    18 05:17


 


 


  (Lexapro)  10 mg  DAILY


 PO  18 09:00


    18 09:22


 


 


  (Ambien)  10 mg  HS  PRN


 PO  18 22:45


    18 23:05


 








Family History


Mother  from colitis and 80, father  from lung cancer at 73


No family hx of colon cancer


Social History


Patient does smoke a pack a day for the last 30 years, she quit yesterday 


No alcohol dependency


 (Vineet Hays)





Review of Systems


Constitutional:  COMPLAINS OF: Fatigue, Weight loss


Endocrine:  DENIES: Polyuria


Eyes:  DENIES: Double Vision


Ears, nose, mouth, throat:  DENIES: Hoarseness


Respiratory:  DENIES: Shortness of breath


Cardiovascular:  DENIES: Lower Extremity Edema


Gastrointestinal:  COMPLAINS OF: Abdominal pain, Bloody stools, Diarrhea, Nausea

, Vomiting, Anorexia, DENIES: Black stools, Constipation, Difficulty Swallowing

, Odynophagia, Swelling of Abdomen, Heartburn, Hematemesis


Genitourinary:  DENIES: Hematuria


Musculoskeletal:  DENIES: Back pain


Integumentary:  DENIES: Jaundice


Hematologic/lymphatic:  DENIES: Bruising


Immunologic/allergic:  DENIES: Eczema


Neurologic:  DENIES: Abnormal gait


Psychiatric:  DENIES: Anxiety (Vineet Hays ARNDEBBIE)





GI Exam


Vitals I&O





Vital Signs








  Date Time  Temp Pulse Resp B/P (MAP) Pulse Ox O2 Delivery O2 Flow Rate FiO2


 


18 12:00 97.7 76 20 118/57 (77) 94   


 


18 08:00 97.5 72 18 125/56 (79) 95   


 


18 07:51     95   


 


18 22:35 97.5 74 20 131/62 (85) 98   


 


18 20:05 98.5 79 16 102/64 (77) 96 Nasal Cannula 2.00 


 


18 19:37     96 Nasal Cannula 2.00 


 


18 15:57   18  94 Nasal Cannula 2.00 


 


18 15:38  92 18 123/62 (82) 96 Nasal Cannula 3.00 














I/O      


 


 18





 07:00 15:00 23:00 07:00 15:00 23:00


 


Intake Total  1200 ml 100 ml 1400 ml 0 ml 


 


Balance  1200 ml 100 ml 1400 ml 0 ml 


 


      


 


Intake IV Total  1200 ml 100 ml 1400 ml 0 ml 


 


# Voids    3  


 


# Bowel Movements    1  








Imaging





Last Impressions








Chest X-Ray 18 1119 Signed





Impressions: 





 Service Date/Time:  2018 11:28 - CONCLUSION:  No acute 





 cardiopulmonary process to explain current clinical symptoms.     Malachi Smith MD 


 


Abdomen/Pelvis CT 18 1119 Signed





Impressions: 





 Service Date/Time:  2018 12:37 - CONCLUSION:  1. Acute on 





 chronic appendicitis suspected in the proper clinical setting; there is an 





 indurated appearing fluid-filled viscus that appears to be blind ending 





 posteromedial to the cecum. No free air seen. 2. Enlarged and severely fatty 





 infiltrated liver. 3. Right ovarian cyst.     Familia Mcgrath MD ADDENDUM:   





 Apparently patient has a normal white count and does not have other clinical 





 features typical for appendicitis. In looking the study over again, there is 





 potentially a portion of a normal appendix seen inferior to the cecum on 

series 





 601 image 68 which would also mitigate against appendicitis. The blind ending 





 pouch posteromedial to the cecum is best nonspecific but I believe abnormal, 





 could be a Meckel's diverticulum. There definitely seems to be some 

inflammatory 





 change of it and the adjacent distal ileum, series 601 image 96. Crohn's 

disease 





 with an adjacent abscess would also be conceivable but unlikely in the setting 





 of a normal white count.   Familia Mcgrath MD 








Laboratory











Test


  18


15:20 18


05:47 18


06:06


 


Blood Gas Patient Temperature 37.0   


 


Blood Gas HCO3 30 mmol/L   


 


Blood Gas Base Excess 5.6 mmol/L   


 


Blood Gas Oxygen Saturation 82 %   


 


Arterial Blood pH 7.41   


 


Arterial Blood Partial


Pressure CO2 49 mmHg 


  


  


 


 


Arterial Blood Partial


Pressure O2 52 mmHg 


  


  


 


 


Arterial Blood Oxygen Content 15.6 Vol %   


 


Arterial Blood


Carboxyhemoglobin 2.3 % 


  


  


 


 


Arterial Blood Methemoglobin 1.1 %   


 


Blood Gas Hemoglobin 13.6 G/DL   


 


Oxygen Delivery Device ROOM AIR   


 


Blood Gas Inspired Oxygen 21 %   


 


White Blood Count  4.2 TH/MM3  


 


Red Blood Count  3.39 MIL/MM3  


 


Hemoglobin  12.1 GM/DL  


 


Hematocrit  34.8 %  


 


Mean Corpuscular Volume  102.8 FL  


 


Mean Corpuscular Hemoglobin  35.7 PG  


 


Mean Corpuscular Hemoglobin


Concent 


  34.7 % 


  


 


 


Red Cell Distribution Width  15.8 %  


 


Platelet Count  136 TH/MM3  


 


Mean Platelet Volume  9.3 FL  


 


Neutrophils (%) (Auto)  85.9 %  


 


Lymphocytes (%) (Auto)  9.5 %  


 


Monocytes (%) (Auto)  4.5 %  


 


Eosinophils (%) (Auto)  0.0 %  


 


Basophils (%) (Auto)  0.1 %  


 


Neutrophils # (Auto)  3.6 TH/MM3  


 


Lymphocytes # (Auto)  0.4 TH/MM3  


 


Monocytes # (Auto)  0.2 TH/MM3  


 


Eosinophils # (Auto)  0.0 TH/MM3  


 


Basophils # (Auto)  0.0 TH/MM3  


 


CBC Comment  DIFF FINAL  


 


Differential Comment    


 


Blood Urea Nitrogen   3 MG/DL 


 


Creatinine   0.43 MG/DL 


 


Random Glucose   211 MG/DL 


 


Total Protein   5.2 GM/DL 


 


Calcium Level   7.1 MG/DL 


 


Sodium Level   134 MEQ/L 


 


Potassium Level   3.5 MEQ/L 


 


Chloride Level   94 MEQ/L 


 


Carbon Dioxide Level   31.7 MEQ/L 


 


Anion Gap   8 MEQ/L 


 


Estimat Glomerular Filtration


Rate 


  


  154 ML/MIN 


 


 


Protein Corrected Calcium   8.1 MG/DL 


 


Total Bilirubin   2.6 MG/DL 


 


Direct Bilirubin   1.8 MG/DL 


 


Indirect Bilirubin   0.8 MG/DL 


 


Aspartate Amino Transf


(AST/SGOT) 


  


  173 U/L 


 


 


Alanine Aminotransferase


(ALT/SGPT) 


  


  78 U/L 


 


 


Alkaline Phosphatase   377 U/L 


 


Albumin   2.6 GM/DL 








Physical Examination


HEENT:  normocephalic; atraumatic; no jaundice.  


CHEST:  Chest is clear to auscultation and percussion.


CARDIAC:  Regular rate and rhythm with no murmur gallop or rubs.


ABDOMEN:  Soft, nondistended, some tenderness ; bowel sounds are present in all 

four quadrants.


EXTREMITIES: No clubbing, cyanosis, or edema.


SKIN:  Normal; no rash; no jaundice.


CNS:  No focal deficits; alert and oriented times three.


 (Vineet Hays)





Assessment and Plan


Plan


- Nausea/vomiting/diarrhea for few weeks-  Hx of  colitis, not certain what 

type. was diagnosed many yrs ago, colonoscopy was done many yrs ago,  


 hasn't been on tx for this, hasn't been following with GI, she presented to 

the ED with few weeks hx of nausea, vomiting, bloody and mucusy diarrhea.


 She states she has lost 22 pounds since.  She has a history of open 

cholecystectomy and reports that she was told her appendix was removed at that 

time.  


 A CT of the abdomen and pelvis noted an intra-abdominal abscess.  Initially 

this was felt to be a periappendiceal abscess, however, delayed imaging showed 


 that questionable area on previous imaging represents decompressed cecum.  

Differential  could include an abscess associated with Crohn's, Meckel's 

diverticulitis,


 or possibly a diverticulum of the cecum. GS on the case recommended medical 

management for now. WBC wnl 


- LFTs are all elevated, bilirubin and alkaline phosphatase higher than 

transaminases. Obstructive pattern,  pt is s/p cholecystectomy. could be 

shocked liver.   She has a history of mild elevation of LFTs but not this high. 


 She has been told that she has an enlarged liver in the past. She is going for 

MRCP soon. Pt not heavy or daily drinker, will order liver work up and monitor 

for now 





Plan:


- Clear liquid


- Cont. abx


- Cont. prednisone


- Stools ordered but not done yet


- Await MRCP


- GS on the case


- Possible colonoscopy on Monday  


- Liver work up


- Supportive care


- Patient seen and examined by Dr. Parr and myself and this note is written 

on his behalf 


 (Vineet Hays)


Physician Comments


Seen and examined with ARNP, CT scan reviewed with pt and family. MRCP today. 

Colonoscopy and possible ercp on monday.


 (Modesto Parr MD)











Vineet Hays 2018 14:29


Modesto Parr MD 2018 17:17

## 2018-04-07 NOTE — RADRPT
EXAM DATE/TIME:  2018 14:36 

 

[facility] COMPARISON:     

CT ABDOMEN & PELVIS W CONTRAST, 2018, 12:37.

       

 

 

INDICATIONS :     

Obstruction.

                     

 

MEDICAL HISTORY :     

None.     

 

SURGICAL HISTORY :     

Cholecystectomy.  section. Tonsillectomy. 

 

ENCOUNTER:     

Initial

 

ACUITY:     

1 day

 

PAIN SCORE:     

5/10

 

LOCATION:         

Abdomen.

 

TECHNIQUE:     

Multiplanar, multisequence magnetic resonance imaging of the abdomen was performed.  High-resolution 
3D dataset was utilized to reconstruct maximum-intensity projection (MIP) images.

 

FINDINGS:     

 

INTRAHEPATIC BILE DUCTS:     

There is mild dilatation of the intrahepatic biliary ducts. There is a area of mild narrowing at the 
biliary duct draining the posterior segment of the right lobe of the liver. No other area of stenosis
 is seen.

 

EXTRAHEPATIC BILE DUCTS:     

The common bile duct measures 6 mm No stone or filling defect is identified.

 

GALLBLADDER:     

The patient is status post cholecystectomy. There is susceptibility artifact from the clips.

 

LIVER:     

The liver is enlarged. There is very prominent diffuse hepatic steatosis.

 

PANCREAS:     

The main pancreatic duct is normal in size.  There is no significant anatomical variant.  Signal inte
nsity is within normal limits.  No mass is visualized on this non-contrast exam.

 

OTHER:     

The remaining visualized structures demonstrate no acute abnormality on this non-contrast exam.

 

CONCLUSION:     

1. Hepatomegaly with very prominent hepatic steatosis.

2. Solitary smooth mild stricturing at the intrahepatic biliary duct draining the posterior segment r
ight lobe of the liver. The distal branches of the posterior segment right lobe of the liver and othe
r distal branches all are the same caliber suggesting a significant stenosis is not present.

3. Mild intrahepatic biliary duct dilatation which may reflect reservoir phenomenon following cholecy
stectomy.

 

 

 

 Familia Robison MD on 2018 at 17:53           

Board Certified Radiologist.

 This report was verified electronically.

## 2018-04-08 VITALS
HEART RATE: 75 BPM | RESPIRATION RATE: 16 BRPM | OXYGEN SATURATION: 91 % | SYSTOLIC BLOOD PRESSURE: 101 MMHG | TEMPERATURE: 98.3 F | DIASTOLIC BLOOD PRESSURE: 53 MMHG

## 2018-04-08 VITALS
SYSTOLIC BLOOD PRESSURE: 111 MMHG | OXYGEN SATURATION: 95 % | RESPIRATION RATE: 16 BRPM | HEART RATE: 67 BPM | TEMPERATURE: 98.7 F | DIASTOLIC BLOOD PRESSURE: 55 MMHG

## 2018-04-08 VITALS
TEMPERATURE: 97.9 F | SYSTOLIC BLOOD PRESSURE: 110 MMHG | HEART RATE: 76 BPM | RESPIRATION RATE: 16 BRPM | OXYGEN SATURATION: 96 % | DIASTOLIC BLOOD PRESSURE: 59 MMHG

## 2018-04-08 VITALS — OXYGEN SATURATION: 96 %

## 2018-04-08 VITALS
HEART RATE: 67 BPM | RESPIRATION RATE: 18 BRPM | DIASTOLIC BLOOD PRESSURE: 54 MMHG | TEMPERATURE: 98 F | SYSTOLIC BLOOD PRESSURE: 107 MMHG | OXYGEN SATURATION: 97 %

## 2018-04-08 VITALS
OXYGEN SATURATION: 95 % | TEMPERATURE: 97.7 F | DIASTOLIC BLOOD PRESSURE: 58 MMHG | RESPIRATION RATE: 16 BRPM | SYSTOLIC BLOOD PRESSURE: 113 MMHG | HEART RATE: 69 BPM

## 2018-04-08 LAB
ALBUMIN SERPL-MCNC: 2.8 GM/DL (ref 3.4–5)
ALP SERPL-CCNC: 390 U/L (ref 45–117)
ALT SERPL-CCNC: 65 U/L (ref 10–53)
AST SERPL-CCNC: 117 U/L (ref 15–37)
BILIRUB INDIRECT SERPL-MCNC: 0.6 MG/DL (ref 0–0.8)
BILIRUB SERPL-MCNC: 1.8 MG/DL (ref 0.2–1)
DIRECT BILIRUBIN ADULT: 1.2 MG/DL (ref 0–0.2)
PROT SERPL-MCNC: 5.7 GM/DL (ref 6.4–8.2)

## 2018-04-08 RX ADMIN — CHOLESTYRAMINE SCH GM: 4 POWDER, FOR SUSPENSION ORAL at 14:00

## 2018-04-08 RX ADMIN — CHOLESTYRAMINE SCH GM: 4 POWDER, FOR SUSPENSION ORAL at 21:13

## 2018-04-08 RX ADMIN — METHYLPREDNISOLONE SODIUM SUCCINATE SCH MG: 40 INJECTION, POWDER, FOR SOLUTION INTRAMUSCULAR; INTRAVENOUS at 08:01

## 2018-04-08 RX ADMIN — CIPROFLOXACIN SCH MLS/HR: 2 INJECTION, SOLUTION INTRAVENOUS at 12:08

## 2018-04-08 RX ADMIN — SODIUM CHLORIDE SCH MLS/HR: 900 INJECTION, SOLUTION INTRAVENOUS at 21:12

## 2018-04-08 RX ADMIN — PHENYTOIN SODIUM SCH MLS/HR: 50 INJECTION INTRAMUSCULAR; INTRAVENOUS at 16:00

## 2018-04-08 RX ADMIN — POTASSIUM CHLORIDE, DEXTROSE MONOHYDRATE AND SODIUM CHLORIDE SCH MLS/HR: 150; 5; 450 INJECTION, SOLUTION INTRAVENOUS at 12:08

## 2018-04-08 RX ADMIN — SODIUM CHLORIDE SCH MLS/HR: 900 INJECTION, SOLUTION INTRAVENOUS at 13:55

## 2018-04-08 RX ADMIN — MAGNESIUM CITRATE SCH ML: 1.75 LIQUID ORAL at 17:08

## 2018-04-08 RX ADMIN — Medication SCH ML: at 21:00

## 2018-04-08 RX ADMIN — METHYLPREDNISOLONE SODIUM SUCCINATE SCH MG: 40 INJECTION, POWDER, FOR SOLUTION INTRAMUSCULAR; INTRAVENOUS at 21:12

## 2018-04-08 RX ADMIN — IPRATROPIUM BROMIDE AND ALBUTEROL SULFATE SCH AMPULE: .5; 3 SOLUTION RESPIRATORY (INHALATION) at 08:25

## 2018-04-08 RX ADMIN — PANTOPRAZOLE SODIUM SCH MG: 40 INJECTION, POWDER, FOR SOLUTION INTRAVENOUS at 05:18

## 2018-04-08 RX ADMIN — POTASSIUM CHLORIDE, DEXTROSE MONOHYDRATE AND SODIUM CHLORIDE SCH MLS/HR: 150; 5; 450 INJECTION, SOLUTION INTRAVENOUS at 21:41

## 2018-04-08 RX ADMIN — SODIUM CHLORIDE SCH MLS/HR: 900 INJECTION, SOLUTION INTRAVENOUS at 05:18

## 2018-04-08 RX ADMIN — HYDROMORPHONE HYDROCHLORIDE PRN MG: 2 INJECTION INTRAMUSCULAR; INTRAVENOUS; SUBCUTANEOUS at 21:14

## 2018-04-08 RX ADMIN — PANTOPRAZOLE SODIUM SCH MG: 40 INJECTION, POWDER, FOR SOLUTION INTRAVENOUS at 16:09

## 2018-04-08 RX ADMIN — IPRATROPIUM BROMIDE AND ALBUTEROL SULFATE SCH AMPULE: .5; 3 SOLUTION RESPIRATORY (INHALATION) at 11:15

## 2018-04-08 RX ADMIN — Medication SCH ML: at 08:01

## 2018-04-08 RX ADMIN — MAGNESIUM CITRATE SCH ML: 1.75 LIQUID ORAL at 14:48

## 2018-04-08 RX ADMIN — ESCITALOPRAM OXALATE SCH MG: 10 TABLET, FILM COATED ORAL at 08:02

## 2018-04-08 RX ADMIN — IPRATROPIUM BROMIDE AND ALBUTEROL SULFATE SCH AMPULE: .5; 3 SOLUTION RESPIRATORY (INHALATION) at 20:00

## 2018-04-08 RX ADMIN — POTASSIUM CHLORIDE, DEXTROSE MONOHYDRATE AND SODIUM CHLORIDE SCH MLS/HR: 150; 5; 450 INJECTION, SOLUTION INTRAVENOUS at 05:18

## 2018-04-08 RX ADMIN — HYDROMORPHONE HYDROCHLORIDE PRN MG: 2 INJECTION INTRAMUSCULAR; INTRAVENOUS; SUBCUTANEOUS at 10:25

## 2018-04-08 RX ADMIN — HYDROMORPHONE HYDROCHLORIDE PRN MG: 2 INJECTION INTRAMUSCULAR; INTRAVENOUS; SUBCUTANEOUS at 14:52

## 2018-04-08 RX ADMIN — ZOLPIDEM TARTRATE PRN MG: 10 TABLET, FILM COATED ORAL at 23:05

## 2018-04-08 RX ADMIN — CIPROFLOXACIN SCH MLS/HR: 2 INJECTION, SOLUTION INTRAVENOUS at 00:10

## 2018-04-08 RX ADMIN — PHENYTOIN SODIUM SCH MLS/HR: 50 INJECTION INTRAMUSCULAR; INTRAVENOUS at 05:18

## 2018-04-08 NOTE — HHI.PR
Subjective


Remarks


53 YOWF with COPD, SOB,Diverticulitis


Seen by surgery


Breathing better


On RA


CTA cancelled





Objective


Vital Signs





Vital Signs








  Date Time  Temp Pulse Resp B/P (MAP) Pulse Ox O2 Delivery O2 Flow Rate FiO2


 


4/8/18 16:00 98.0 67 18 107/54 (71) 97   


 


4/8/18 12:00 98.3 75 16 101/53 (69) 91   


 


4/8/18 08:25     96   21


 


4/8/18 08:00 97.9 76 16 110/59 (76) 96   


 


4/8/18 00:00 98.7 67 16 111/55 (73) 95   


 


4/7/18 22:43    96/56 (69)    


 


4/7/18 20:00 97.9 86 18 92/56 (68) 94   


 


4/7/18 19:57     93   














I/O      


 


 4/7/18 4/7/18 4/7/18 4/8/18 4/8/18 4/8/18





 07:00 15:00 23:00 07:00 15:00 23:00


 


Intake Total 1400 ml 0 ml 700 ml 1300 ml  1400 ml


 


Output Total    700 ml  


 


Balance 1400 ml 0 ml 700 ml 600 ml  1400 ml


 


      


 


Intake Oral   600 ml   


 


IV Total 1400 ml 0 ml 100 ml 1300 ml  1400 ml


 


Output Urine Total    700 ml  


 


# Voids 3  4   


 


# Bowel Movements 1  3 2  








Result Diagram:  


4/7/18 0547                                                                    

            4/7/18 0606





Objective Remarks


GENERAL: MBMN WF,NAD


SKIN: Warm and dry.


HEAD: Normocephalic.


EYES: No scleral icterus. No injection or drainage. 


NECK: Supple, trachea midline. No JVD or lymphadenopathy.


CARDIOVASCULAR: Regular rate and rhythm without murmurs, gallops, or rubs. 


RESPIRATORY: Breath sounds equal bilaterally. No accessory muscle use.


GASTROINTESTINAL: Abdomen soft, non-tender, nondistended. 


MUSCULOSKELETAL: No cyanosis, or edema. 


BACK: Nontender without obvious deformity. No CVA tenderness.








A/P


Assessment and Plan


IMPRESSION:


1.  Shortness of Breath and Hypoxia, improving


2.  Chronic obstructive pulmonary disease exacerbation.


3.  Diverticulitis.


4.  Anxiety and depression.


 





PLAN:





Aerosol nebs


Cont Abx


Supplement 02


Surgery following


KAREN Pt and her .


KAREN Nagy,Sonny Finney MD Apr 8, 2018 19:33

## 2018-04-08 NOTE — HHI.PR
__________________________________________________





Subjective


Subjective Notes


Abdominal cramping but not significant pain.  Finishing bowel prep for 

colonoscopy tomorrow.  Relates that she had multiple operations for pancreatic 

debridement in the late 80s.





Objective


Vitals/I&O





Vital Signs








  Date Time  Temp Pulse Resp B/P (MAP) Pulse Ox O2 Delivery O2 Flow Rate FiO2


 


4/8/18 16:00 98.0 67 18 107/54 (71) 97   


 


4/8/18 08:25        21


 


4/6/18 20:05      Nasal Cannula 2.00 








Labs





Laboratory Tests








Test


  4/7/18


19:12 4/7/18


19:25 4/8/18


13:32


 


Hepatitis A IgM Antibody NONREACTIVE   


 


Hepatitis B Surface Antigen NONREACTIVE   


 


Hepatitis B Core IgM Antibody NONREACTIVE   


 


Hepatitis C IgG Antibody NONREACTIVE   


 


Stool C. difficile Toxin (PCR)  NEGATIVE  


 


Stl C. difficile Toxin


Epiderm 027 


  PRESUMPTIVE


NEGATIVE 


 


 


Total Bilirubin   1.8 


 


Direct Bilirubin   1.2 


 


Indirect Bilirubin   0.6 


 


Aspartate Amino Transf


(AST/SGOT) 


  


  117 


 


 


Alanine Aminotransferase


(ALT/SGPT) 


  


  65 


 


 


Alkaline Phosphatase   390 


 


Total Protein   5.7 


 


Albumin   2.8 














 Date/Time


Source Procedure


Growth Status


 


 


 4/7/18 19:25


Stool Stool Cryptosporidium Exam


Pending Received


 


 4/7/18 19:25


Stool Stool Giardia Antigen (GEO)


Pending Received








Radiology





Last Impressions








Chest X-Ray 4/6/18 1119 Signed





Impressions: 





 Service Date/Time:  Friday, April 6, 2018 11:28 - CONCLUSION:  No acute 





 cardiopulmonary process to explain current clinical symptoms.     Malachi Smith MD 


 


Abdomen/Pelvis CT 4/6/18 1119 Signed





Impressions: 





 Service Date/Time:  Friday, April 6, 2018 12:37 - CONCLUSION:  1. Acute on 





 chronic appendicitis suspected in the proper clinical setting; there is an 





 indurated appearing fluid-filled viscus that appears to be blind ending 





 posteromedial to the cecum. No free air seen. 2. Enlarged and severely fatty 





 infiltrated liver. 3. Right ovarian cyst.     Familia Mcgrath MD ADDENDUM:   





 Apparently patient has a normal white count and does not have other clinical 





 features typical for appendicitis. In looking the study over again, there is 





 potentially a portion of a normal appendix seen inferior to the cecum on 

series 





 601 image 68 which would also mitigate against appendicitis. The blind ending 





 pouch posteromedial to the cecum is best nonspecific but I believe abnormal, 





 could be a Meckel's diverticulum. There definitely seems to be some 

inflammatory 





 change of it and the adjacent distal ileum, series 601 image 96. Crohn's 

disease 





 with an adjacent abscess would also be conceivable but unlikely in the setting 





 of a normal white count.   Familia Mcgrath MD 








Narrative Exam


NAD


Abd: mild distention, mild diffuse ttp, large right subcostal scar





A/P


Assessment and Plan


52 yo F with ? intraabdominal abscess, bloody diarrhea, increased LFTs with 

hepatomegaly.  





For EGD/colonoscopy tomorrow.  C dif negative.





MRCP shows hepatomegaly with hepatic steatosis.  Stricturing of one branch of 

right hepatic duct.  She relates h/o multiple pancreatic debridements in late 

80s with open abdomen in hospital 6 weeks.





? abscess- this does not seem to be main issue at this time, but will consider 

repeat CT a/p later next week.











Bradley Strauss MD Apr 8, 2018 16:58

## 2018-04-08 NOTE — HHI.PR
Subjective


Remarks


Follow up for Ulcerative colitis, possible intra-abdominal abscess. Patient is 

doing well. No fever, chills. She has occasional headache and hesitant to take 

Acetaminophen for headache due to her liver disease. She mentions that she 

usually drinks 2 alcoholic drinks a day. However, brother at bedside states 

that she drinks much more. She drinks Vodka constantly.





Objective


Vitals





Vital Signs








  Date Time  Temp Pulse Resp B/P (MAP) Pulse Ox O2 Delivery O2 Flow Rate FiO2


 


4/8/18 08:25     96   21


 


4/8/18 08:00 97.9 76 16 110/59 (76) 96   


 


4/8/18 00:00 98.7 67 16 111/55 (73) 95   


 


4/7/18 22:43    96/56 (69)    


 


4/7/18 20:00 97.9 86 18 92/56 (68) 94   


 


4/7/18 19:57     93   


 


4/7/18 16:00 97.9 80 18 100/59 (73) 94   


 


4/7/18 12:00 97.7 76 20 118/57 (77) 94   














I/O      


 


 4/7/18 4/7/18 4/7/18 4/8/18 4/8/18 4/8/18





 07:00 15:00 23:00 07:00 15:00 23:00


 


Intake Total 1400 ml 0 ml 700 ml 1300 ml  


 


Output Total    700 ml  


 


Balance 1400 ml 0 ml 700 ml 600 ml  


 


      


 


Intake Oral   600 ml   


 


IV Total 1400 ml 0 ml 100 ml 1300 ml  


 


Output Urine Total    700 ml  


 


# Voids 3  4   


 


# Bowel Movements 1  3 2  








Result Diagram:  


4/7/18 0547                                                                    

            4/7/18 0606





Objective Remarks


GENERAL: Alert, Oriented x 3, NAD. 


SKIN: Warm and dry.


HEAD: Normocephalic.


EYES: No scleral icterus. No injection or drainage. 


NECK: Supple, trachea midline. No JVD or lymphadenopathy.


CARDIOVASCULAR: Regular rate and rhythm without murmurs, gallops, or rubs. 


RESPIRATORY: Breath sounds equal bilaterally. No accessory muscle use.


GASTROINTESTINAL: Abdomen soft, mild tenderness on palpation, nondistended. 


MUSCULOSKELETAL: No cyanosis, or edema. 


BACK: Nontender without obvious deformity. No CVA tenderness.





Procedures


None.





A/P


Problem List:  


(1) Abdominal pain


ICD Code:  R10.9 - Unspecified abdominal pain


Status:  Acute


(2) Ulcerative colitis


ICD Code:  K51.90 - Ulcerative colitis, unspecified, without complications


Status:  Acute


(3) COPD with acute exacerbation


ICD Code:  J44.1 - Chronic obstructive pulmonary disease with (acute) 

exacerbation


Status:  Acute


Assessment and Plan


This patient is a 53-year-old female with a history of ulcerative colitis who 

was admitted to the hospital due to nausea, vomiting, diarrhea and abdominal 

pain. CT abd/Pelvis was concerning for inflammatory changes around appendix vs. 

abscess. General surgery was consulted. 





Ulcerative colitis


Abdominal pain


Possible intra-abdominal abscess


   Will continue Cipro IV as well as Flagyl IV


   continue Solu-Medrol 40mg Q12hrs. Continue IV fluid 100cc/hour. 


   General surgery evaluated patient - recommended IV abx for possible abscess 

as well as PPI for Mecke's diverticulum


   GI consulted - ERCP, Colonoscopy planned for 4/9/2018. 





Hypokalemia


   Improved, K+ 2.7 --> 3.5. 





Alcohol dependence


Hepatomegaly


   Will start CIWA protocol. Advised patient not to drink alcohol at all. 


   Okay to use Acetaminophen upto 2g a day for headache. Avoid NSAIDs. 





COPD exacerbation with hypoxia


   Currently patient is on Room air. O2 sat 96% on room air.


   Discussed with Pulmonology. Will discontinue CT Pulm Angiogram order. 





Full code. SCDs.





Problem Qualifiers





(1) Abdominal pain:  


Qualified Codes:  R10.31 - Right lower quadrant pain


(2) Ulcerative colitis:  


Qualified Codes:  K51.919 - Ulcerative colitis, unspecified with unspecified 

complications








Margoth Zamarripa DO Apr 8, 2018 10:36 am

## 2018-04-08 NOTE — HHI.GIFU
Subjective


Remarks


Patient is awake resting in the bed in the bed elevated 30


Mild anxiety noted over current condition which includes uncontrolled diarrhea, 

states at least 10 times today. 


Pale facial color


Afebrile


 (Paulina Gruber)





Objective


Vitals I&O





Vital Signs








  Date Time  Temp Pulse Resp B/P (MAP) Pulse Ox O2 Delivery O2 Flow Rate FiO2


 


4/8/18 12:00 98.3 75 16 101/53 (69) 91   


 


4/8/18 08:25     96   21


 


4/8/18 08:00 97.9 76 16 110/59 (76) 96   


 


4/8/18 00:00 98.7 67 16 111/55 (73) 95   


 


4/7/18 22:43    96/56 (69)    


 


4/7/18 20:00 97.9 86 18 92/56 (68) 94   


 


4/7/18 19:57     93   


 


4/7/18 16:00 97.9 80 18 100/59 (73) 94   














I/O      


 


 4/7/18 4/7/18 4/7/18 4/8/18 4/8/18 4/8/18





 07:00 15:00 23:00 07:00 15:00 23:00


 


Intake Total 1400 ml 0 ml 700 ml 1300 ml  


 


Output Total    700 ml  


 


Balance 1400 ml 0 ml 700 ml 600 ml  


 


      


 


Intake Oral   600 ml   


 


IV Total 1400 ml 0 ml 100 ml 1300 ml  


 


Output Urine Total    700 ml  


 


# Voids 3  4   


 


# Bowel Movements 1  3 2  








Laboratory





Laboratory Tests








Test


  4/7/18


19:12 4/7/18


19:25


 


Hepatitis A IgM Antibody NONREACTIVE  


 


Hepatitis B Surface Antigen NONREACTIVE  


 


Hepatitis B Core IgM Antibody NONREACTIVE  


 


Hepatitis C IgG Antibody NONREACTIVE  


 


Stool C. difficile Toxin (PCR)  NEGATIVE 


 


Stl C. difficile Toxin


Epiderm 027 


  PRESUMPTIVE


NEGATIVE














 Date/Time


Source Procedure


Growth Status


 


 


 4/7/18 19:25


Stool Stool Cryptosporidium Exam


Pending Received


 


 4/7/18 19:25


Stool Stool Giardia Antigen (GEO)


Pending Received








Imaging





Last Impressions








Cholangiopancreatography MRI 4/7/18 0000 Signed





Impressions: 





 Service Date/Time:  Saturday, April 7, 2018 14:36 - CONCLUSION:  1. 

Hepatomegaly 





 with very prominent hepatic steatosis. 2. Solitary smooth mild stricturing at 





 the intrahepatic biliary duct draining the posterior segment right lobe of the 





 liver. The distal branches of the posterior segment right lobe of the liver 

and 





 other distal branches all are the same caliber suggesting a significant 

stenosis 





 is not present. 3. Mild intrahepatic biliary duct dilatation which may reflect 





 reservoir phenomenon following cholecystectomy.     Familia Robison MD 


 


Chest X-Ray 4/6/18 1119 Signed





Impressions: 





 Service Date/Time:  Friday, April 6, 2018 11:28 - CONCLUSION:  No acute 





 cardiopulmonary process to explain current clinical symptoms.     Malachi Smith MD 


 


Abdomen/Pelvis CT 4/6/18 1119 Signed





Impressions: 





 Service Date/Time:  Friday, April 6, 2018 12:37 - CONCLUSION:  1. Acute on 





 chronic appendicitis suspected in the proper clinical setting; there is an 





 indurated appearing fluid-filled viscus that appears to be blind ending 





 posteromedial to the cecum. No free air seen. 2. Enlarged and severely fatty 





 infiltrated liver. 3. Right ovarian cyst.     Familia Mcgrath MD ADDENDUM:   





 Apparently patient has a normal white count and does not have other clinical 





 features typical for appendicitis. In looking the study over again, there is 





 potentially a portion of a normal appendix seen inferior to the cecum on 

series 





 601 image 68 which would also mitigate against appendicitis. The blind ending 





 pouch posteromedial to the cecum is best nonspecific but I believe abnormal, 





 could be a Meckel's diverticulum. There definitely seems to be some 

inflammatory 





 change of it and the adjacent distal ileum, series 601 image 96. Crohn's 

disease 





 with an adjacent abscess would also be conceivable but unlikely in the setting 





 of a normal white count.   Familia Mcgrath MD 








Physical Exam


HEENT: Pupils round and reactive to light; normocephalic; atraumatic pale mild 

puffiness to her face, 


NECK: Neck is supple,


CHEST:  Chest is clear out audible rhonchi


CARDIAC:  Regular rate and rhythm


ABDOMEN:  Round, mild bloating noted, diffuse abdominal cramping especially in 

the lower regions with diarrhea no hepatosplenomegaly; bowel sounds are present 

in all four quadrants.


EXTREMITIES: No LE edema.


SKIN:  Normal; no rash; no jaundice.


CNS:  No focal deficits; alert and oriented times three.  Anxiety mild


 (Paulina Gruber)





Assessment and Plan


Plan


- Nausea/vomiting/diarrhea for few weeks-  Hx of  colitis, not certain what 

type. was diagnosed many yrs ago, colonoscopy was done many yrs ago,  


 hasn't been on tx for this, hasn't been following with GI, she presented to 

the ED with few weeks hx of nausea, vomiting, bloody and mucusy diarrhea.


 She states she has lost 22 pounds since.  She has a history of open 

cholecystectomy and reports that she was told her appendix was removed at that 

time.  


 A CT of the abdomen and pelvis noted an intra-abdominal abscess.  Initially 

this was felt to be a periappendiceal abscess, however, delayed imaging showed 


 that questionable area on previous imaging represents decompressed cecum.  

Differential  could include an abscess associated with Crohn's, Meckel's 

diverticulitis,


 or possibly a diverticulum of the cecum. GS on the case recommended medical 

management for now. WBC wnl 


- LFTs are all elevated, bilirubin and alkaline phosphatase higher than 

transaminases. Obstructive pattern,  pt is s/p cholecystectomy. could be 

shocked liver.   She has a history of mild elevation of LFTs but not this high. 


 She has been told that she has an enlarged liver in the past. She is going for 

MRCP soon. Pt not heavy or daily drinker, will order liver work up and monitor 

for now 


Hepatic steatosis, possible stricture 04/07/18 MRCP shows hepatomegaly with 

very prominent hepatic steatosis.  Smooth muscle mild stricturing at the 

intrahepatic biliary duct draining the posterior segment right lobe of liver.  

Other distal branches of the posterior segment right lobe of the liver shows 

significant stenosis is not present.  Mild intrahepatic duct dilatation may 

reflect reservoir phenomena following cholecystectomy.  Patient states history 

of IBS, and symptoms have been worse since cholecystectomy


Appreciate GS input





Plan:


- EGD and colonoscopy for Monday, consent


-Clear liquids today, nothing by mouth at midnight except for meds


- Prep magnesium citrate 3 bottles, 


- Nothing by mouth at midnight except for meds needed


- Cholestyramine every 8 hours as needed


- Plan ERCP probable Tuesday, consent ordered


- PPI


- consider Creon, but after ERCP done


- Anti-emetics


- Cipro, Flagyl


- Supportive care


- Patient seen and examined by Dr. Parr and myself and this note is written 

on his behalf 


 (Paulina Gruber)


Physician Comments


Seen and examined with ARNP, egd/colonoscopy tomorrow. ERCP on tuesday based on 

MRCP and labs.Bowel prep. Dr. Grimm will follow. 


 ([place] Parr MD)











Paulina Gruber Apr 8, 2018 12:55


[place] Parr MD Apr 8, 2018 14:21

## 2018-04-09 VITALS
DIASTOLIC BLOOD PRESSURE: 83 MMHG | OXYGEN SATURATION: 94 % | SYSTOLIC BLOOD PRESSURE: 116 MMHG | RESPIRATION RATE: 18 BRPM | HEART RATE: 68 BPM | TEMPERATURE: 98.1 F

## 2018-04-09 VITALS
OXYGEN SATURATION: 94 % | TEMPERATURE: 97.7 F | DIASTOLIC BLOOD PRESSURE: 60 MMHG | RESPIRATION RATE: 17 BRPM | SYSTOLIC BLOOD PRESSURE: 120 MMHG | HEART RATE: 66 BPM

## 2018-04-09 VITALS
HEART RATE: 90 BPM | RESPIRATION RATE: 18 BRPM | SYSTOLIC BLOOD PRESSURE: 96 MMHG | TEMPERATURE: 97.9 F | DIASTOLIC BLOOD PRESSURE: 51 MMHG | OXYGEN SATURATION: 94 %

## 2018-04-09 VITALS
SYSTOLIC BLOOD PRESSURE: 111 MMHG | HEART RATE: 81 BPM | RESPIRATION RATE: 18 BRPM | OXYGEN SATURATION: 92 % | TEMPERATURE: 97.5 F | DIASTOLIC BLOOD PRESSURE: 51 MMHG

## 2018-04-09 PROCEDURE — 0DBP8ZX EXCISION OF RECTUM, VIA NATURAL OR ARTIFICIAL OPENING ENDOSCOPIC, DIAGNOSTIC: ICD-10-PCS | Performed by: INTERNAL MEDICINE

## 2018-04-09 PROCEDURE — 0DJ08ZZ INSPECTION OF UPPER INTESTINAL TRACT, VIA NATURAL OR ARTIFICIAL OPENING ENDOSCOPIC: ICD-10-PCS | Performed by: INTERNAL MEDICINE

## 2018-04-09 PROCEDURE — 0DBH8ZX EXCISION OF CECUM, VIA NATURAL OR ARTIFICIAL OPENING ENDOSCOPIC, DIAGNOSTIC: ICD-10-PCS | Performed by: INTERNAL MEDICINE

## 2018-04-09 RX ADMIN — HYDROMORPHONE HYDROCHLORIDE PRN MG: 2 INJECTION INTRAMUSCULAR; INTRAVENOUS; SUBCUTANEOUS at 06:22

## 2018-04-09 RX ADMIN — CHOLESTYRAMINE SCH GM: 4 POWDER, FOR SUSPENSION ORAL at 21:15

## 2018-04-09 RX ADMIN — IPRATROPIUM BROMIDE AND ALBUTEROL SULFATE SCH AMPULE: .5; 3 SOLUTION RESPIRATORY (INHALATION) at 09:21

## 2018-04-09 RX ADMIN — SODIUM CHLORIDE SCH MLS/HR: 900 INJECTION, SOLUTION INTRAVENOUS at 05:18

## 2018-04-09 RX ADMIN — METHYLPREDNISOLONE SODIUM SUCCINATE SCH MG: 40 INJECTION, POWDER, FOR SOLUTION INTRAMUSCULAR; INTRAVENOUS at 08:36

## 2018-04-09 RX ADMIN — IPRATROPIUM BROMIDE AND ALBUTEROL SULFATE SCH AMPULE: .5; 3 SOLUTION RESPIRATORY (INHALATION) at 14:00

## 2018-04-09 RX ADMIN — IPRATROPIUM BROMIDE AND ALBUTEROL SULFATE SCH AMPULE: .5; 3 SOLUTION RESPIRATORY (INHALATION) at 20:00

## 2018-04-09 RX ADMIN — PHENYTOIN SODIUM SCH MLS/HR: 50 INJECTION INTRAMUSCULAR; INTRAVENOUS at 22:33

## 2018-04-09 RX ADMIN — SODIUM CHLORIDE SCH MLS/HR: 900 INJECTION, SOLUTION INTRAVENOUS at 21:24

## 2018-04-09 RX ADMIN — CIPROFLOXACIN SCH MLS/HR: 2 INJECTION, SOLUTION INTRAVENOUS at 15:27

## 2018-04-09 RX ADMIN — POTASSIUM CHLORIDE, DEXTROSE MONOHYDRATE AND SODIUM CHLORIDE SCH MLS/HR: 150; 5; 450 INJECTION, SOLUTION INTRAVENOUS at 18:45

## 2018-04-09 RX ADMIN — MAGNESIUM CITRATE SCH ML: 1.75 LIQUID ORAL at 03:10

## 2018-04-09 RX ADMIN — METHYLPREDNISOLONE SODIUM SUCCINATE SCH MG: 40 INJECTION, POWDER, FOR SOLUTION INTRAMUSCULAR; INTRAVENOUS at 21:15

## 2018-04-09 RX ADMIN — PANTOPRAZOLE SODIUM SCH MG: 40 INJECTION, POWDER, FOR SOLUTION INTRAVENOUS at 15:32

## 2018-04-09 RX ADMIN — ESCITALOPRAM OXALATE SCH MG: 10 TABLET, FILM COATED ORAL at 08:36

## 2018-04-09 RX ADMIN — HYDROMORPHONE HYDROCHLORIDE PRN MG: 2 INJECTION INTRAMUSCULAR; INTRAVENOUS; SUBCUTANEOUS at 17:32

## 2018-04-09 RX ADMIN — Medication SCH ML: at 08:37

## 2018-04-09 RX ADMIN — ZOLPIDEM TARTRATE PRN MG: 10 TABLET, FILM COATED ORAL at 21:23

## 2018-04-09 RX ADMIN — CHOLESTYRAMINE SCH GM: 4 POWDER, FOR SUSPENSION ORAL at 05:19

## 2018-04-09 RX ADMIN — HYDROMORPHONE HYDROCHLORIDE PRN MG: 2 INJECTION INTRAMUSCULAR; INTRAVENOUS; SUBCUTANEOUS at 21:16

## 2018-04-09 RX ADMIN — PANTOPRAZOLE SODIUM SCH MG: 40 INJECTION, POWDER, FOR SOLUTION INTRAVENOUS at 05:18

## 2018-04-09 RX ADMIN — CHOLESTYRAMINE SCH GM: 4 POWDER, FOR SUSPENSION ORAL at 15:27

## 2018-04-09 RX ADMIN — PHENYTOIN SODIUM SCH MLS/HR: 50 INJECTION INTRAMUSCULAR; INTRAVENOUS at 01:08

## 2018-04-09 RX ADMIN — Medication SCH ML: at 21:00

## 2018-04-09 RX ADMIN — MAGNESIUM CITRATE SCH ML: 1.75 LIQUID ORAL at 18:00

## 2018-04-09 RX ADMIN — MAGNESIUM CITRATE SCH ML: 1.75 LIQUID ORAL at 13:00

## 2018-04-09 RX ADMIN — POTASSIUM CHLORIDE, DEXTROSE MONOHYDRATE AND SODIUM CHLORIDE SCH MLS/HR: 150; 5; 450 INJECTION, SOLUTION INTRAVENOUS at 08:37

## 2018-04-09 RX ADMIN — CIPROFLOXACIN SCH MLS/HR: 2 INJECTION, SOLUTION INTRAVENOUS at 01:06

## 2018-04-09 RX ADMIN — PHENYTOIN SODIUM SCH MLS/HR: 50 INJECTION INTRAMUSCULAR; INTRAVENOUS at 08:30

## 2018-04-09 RX ADMIN — HYDROMORPHONE HYDROCHLORIDE PRN MG: 2 INJECTION INTRAMUSCULAR; INTRAVENOUS; SUBCUTANEOUS at 10:52

## 2018-04-09 RX ADMIN — SODIUM CHLORIDE SCH MLS/HR: 900 INJECTION, SOLUTION INTRAVENOUS at 15:28

## 2018-04-09 NOTE — HHI.PR
Subjective


Remarks


Follow up for Ulcerative colitis, possible intra-abdominal abscess. Patient is 

currently doing well. No fever, chills. Wants to eat. She is scheduled for EGD, 

Colonoscopy today.





Objective


Vitals





Vital Signs








  Date Time  Temp Pulse Resp B/P (MAP) Pulse Ox O2 Delivery O2 Flow Rate FiO2


 


4/9/18 08:00 97.9 90 18 96/51 (66) 94   


 


4/9/18 00:00 97.5 81 18 111/51 (71) 92   


 


4/8/18 20:00 97.7 69 16 113/58 (76) 95   


 


4/8/18 16:00 98.0 67 18 107/54 (71) 97   














I/O      


 


 4/8/18 4/8/18 4/8/18 4/9/18 4/9/18 4/9/18





 07:00 15:00 23:00 07:00 15:00 23:00


 


Intake Total 1300 ml  2500 ml 300 ml 1000 ml 


 


Output Total 700 ml     


 


Balance 600 ml  2500 ml 300 ml 1000 ml 


 


      


 


IV Total 1300 ml  2500 ml 300 ml 1000 ml 


 


Output Urine Total 700 ml     


 


# Voids    3  


 


# Bowel Movements 2   5  








Result Diagram:  


4/7/18 0547                                                                    

            4/7/18 0606





Objective Remarks


GENERAL: Alert, Oriented x 3, NAD. 


SKIN: Warm and dry.


HEAD: Normocephalic.


EYES: No scleral icterus. No injection or drainage. 


NECK: Supple, trachea midline. No JVD or lymphadenopathy.


CARDIOVASCULAR: Regular rate and rhythm without murmurs, gallops, or rubs. 


RESPIRATORY: Breath sounds equal bilaterally. No accessory muscle use.


GASTROINTESTINAL: Abdomen soft, mild tenderness on palpation, nondistended. 


MUSCULOSKELETAL: No cyanosis, or edema. 


BACK: Nontender without obvious deformity. No CVA tenderness.





Procedures


None.





A/P


Problem List:  


(1) Abdominal pain


ICD Code:  R10.9 - Unspecified abdominal pain


Status:  Acute


(2) Ulcerative colitis


ICD Code:  K51.90 - Ulcerative colitis, unspecified, without complications


Status:  Acute


(3) COPD with acute exacerbation


ICD Code:  J44.1 - Chronic obstructive pulmonary disease with (acute) 

exacerbation


Status:  Acute


Assessment and Plan


This patient is a 53-year-old female with a history of ulcerative colitis who 

was admitted to the hospital due to nausea, vomiting, diarrhea and abdominal 

pain. CT abd/Pelvis was concerning for inflammatory changes around appendix vs. 

abscess. General surgery was consulted. 





Ulcerative colitis


Abdominal pain


Possible intra-abdominal abscess


   Will continue Cipro IV as well as Flagyl IV


   continue Solu-Medrol 40mg Q12hrs. Continue IV fluid 100cc/hour. 


   General surgery evaluated patient - recommended IV abx for possible abscess 

as well as PPI for Mecke's diverticulum


   GI consulted - ERCP, Colonoscopy planned for 4/9/2018. 


   If okay with GI, we can start a diet on patient upon endoscopic studies. 


   If she tolerates food well, she can likely be discharged on oral abx. 





Hypokalemia


   Improved, K+ 2.7 --> 3.5. 





Alcohol dependence


Hepatomegaly


   [facility] protocol. Advised patient not to drink alcohol at all. 


   Okay to use Acetaminophen up to 2g a day for headache. Avoid NSAIDs. 





COPD exacerbation with hypoxia


   Currently patient is on Room air. O2 sat 96% on room air.





Full code. SCDs.





Problem Qualifiers





(1) Abdominal pain:  


Qualified Codes:  R10.31 - Right lower quadrant pain


(2) Ulcerative colitis:  


Qualified Codes:  K51.919 - Ulcerative colitis, unspecified with unspecified 

complications








Margoth Zamarripa DO Apr 9, 2018 12:11 pm

## 2018-04-09 NOTE — HHI.GIFU
Subjective


Remarks


Patient laying in bed comfortably, no new complain, no sign of active bleeding, 

had MRI. done





Objective


Vitals I&O





Vital Signs








  Date Time  Temp Pulse Resp B/P (MAP) Pulse Ox O2 Delivery O2 Flow Rate FiO2


 


4/9/18 08:00 97.9 90 18 96/51 (66) 94   


 


4/9/18 00:00 97.5 81 18 111/51 (71) 92   


 


4/8/18 20:00 97.7 69 16 113/58 (76) 95   


 


4/8/18 16:00 98.0 67 18 107/54 (71) 97   














I/O      


 


 4/8/18 4/8/18 4/8/18 4/9/18 4/9/18 4/9/18





 07:00 15:00 23:00 07:00 15:00 23:00


 


Intake Total 1300 ml  2500 ml 300 ml 1000 ml 


 


Output Total 700 ml     


 


Balance 600 ml  2500 ml 300 ml 1000 ml 


 


      


 


IV Total 1300 ml  2500 ml 300 ml 1000 ml 


 


Output Urine Total 700 ml     


 


# Voids    3  


 


# Bowel Movements 2   5  








Laboratory











 Date/Time


Source Procedure


Growth Status


 


 


 4/7/18 19:25


Stool Stool Cryptosporidium Exam - Final


NEGATIVE - NO CRYPTOSPORIDIUM ANTIGEN... Complete


 


 4/7/18 19:25


Stool Stool Giardia Antigen (GEO) - Final


NEGATIVE - NO GIARDIA ANTIGEN DETECTE... Complete








Physical Exam


HEENT: Pupils round and reactive to light; normocephalic; atraumatic pale mild 

puffiness to her face, 


NECK: Neck is supple,


CHEST:  Chest is clear out audible rhonchi


CARDIAC:  Regular rate and rhythm


ABDOMEN:  Round, mild bloating noted, diffuse abdominal cramping especially in 

the lower regions with diarrhea no hepatosplenomegaly; bowel sounds are present 

in all four quadrants.


EXTREMITIES: No LE edema.


SKIN:  Normal; no rash; no jaundice.


CNS:  No focal deficits; alert and oriented times three.  Anxiety mild





Assessment and Plan


Plan


- Nausea/vomiting/diarrhea for few weeks-  Hx of  colitis, not certain what 

type. was diagnosed many yrs ago, colonoscopy was done many yrs ago,  


 hasn't been on tx for this, hasn't been following with GI, she presented to 

the ED with few weeks hx of nausea, vomiting, bloody and mucusy diarrhea.


 She states she has lost 22 pounds since.  She has a history of open 

cholecystectomy and reports that she was told her appendix was removed at that 

time.  


 A CT of the abdomen and pelvis noted an intra-abdominal abscess.  Initially 

this was felt to be a periappendiceal abscess, however, delayed imaging showed 


 that questionable area on previous imaging represents decompressed cecum.  

Differential  could include an abscess associated with Crohn's, Meckel's 

diverticulitis,


 or possibly a diverticulum of the cecum. GS on the case recommended medical 

management for now. WBC wnl 


- LFTs are all elevated, bilirubin and alkaline phosphatase higher than 

transaminases. Obstructive pattern,  pt is s/p cholecystectomy. could be 

shocked liver.   She has a history of mild elevation of LFTs but not this high. 


 She has been told that she has an enlarged liver in the past. She is going for 

MRCP soon. Pt not heavy or daily drinker, will order liver work up and monitor 

for now 


Hepatic steatosis, possible stricture 04/07/18 MRCP shows hepatomegaly with 

very prominent hepatic steatosis.  Smooth muscle mild stricturing at the 

intrahepatic biliary duct draining the posterior segment right lobe of liver.  

Other distal branches of the posterior segment right lobe of the liver shows 

significant stenosis is not present.  Mild intrahepatic duct dilatation may 

reflect reservoir phenomena following cholecystectomy.  Patient states history 

of IBS, and symptoms have been worse since cholecystectomy


Appreciate GS input





4/9/2018 patient is doing okay, had upper endoscopy and colonoscopy, severe 

hepatics steatosis, questionable narrowing on MRI in the hepatic duct no mass 

or biliary tree abnormality


Diarrhea could be related to previous cholecystectomy and IBS





IMPRESSION:


Normal upper endoscopy


Normal terminal ileum


Minimal erythema in the cecum biopsy was done


Normal colonoscopy otherwise biopsy randomly was done from the sigmoid for 

diarrhea


No clear reason for the patient weight loss and others GI symptom





PLAN:


May feed patient


Await biopsy results


No alcohol


I had a discussion with radiology about the finding on the MRI and CT scan he 

does not think that there is a mass in the liver, possible narrowing of the 

biliary tree, he does not think that ERCP will add more information,


We will wait until the lab is back to rule out other etiology such as PSC


Follow-up labs


No alcohol


Low-fat diet


No ERCP at this time


- Cholestyramine every 8 hours as needed


- PPI


- consider Creon, but after ERCP done


- Continue Anti-emetics


- Continue Cipro, Flagyl


- Supportive care











Staci Grimm MD Apr 9, 2018 14:41

## 2018-04-09 NOTE — PD.PROCEDR
GI Procedure


PROCEDURE PERFORMED


Upper endoscopy


Colonoscopy with biopsy





Procedure indication 





Nausea, vomiting, diarrhea, weight loss





PROCEDURE:


The procedure, risks and benefits were discussed with Ms. Irene and 

informed


consent was obtained.  Anesthesia sedated her with Diprivan.  She was placed in 

the left lateral decubitus position.





EGD:


The Pentax videoscope was introduced through the oropharynx and advanced to the 

second portion of the duodenum under direct visualization. Retroflexion was 

performed in the stomach. 





Colonoscopy:


The Pentax videoscope was introduced through the rectum and advanced to cecum 

and terminal ileum. Retroflexion was performed in the rectum. Colonic prep was 

good, biopsy from the cecum and this rectum








ESTIMATED BLOOD LOSS:


None





SPECIMENS REMOVED:


Cecum


Sigmoid





COMPLICATIONS:


None





IMPRESSION:


Normal upper endoscopy


Normal terminal ileum


Minimal erythema in the cecum biopsy was done


Normal colonoscopy otherwise biopsy randomly was done from the sigmoid for 

diarrhea


No clear reason for the patient weight loss and others GI symptom





PLAN:


May feed patient


Await biopsy results


No alcohol


I had a discussion with radiology about the finding on the MRI and CT scan he 

does not think that there is a mass in the liver, possible narrowing of the 

biliary tree, he does not think that ERCP will add more information,


We will wait until the lab is back to rule out other etiology such as PSC


Follow-up labs


No alcohol


Low-fat diet











Staci Grimm MD Apr 9, 2018 14:38

## 2018-04-10 VITALS
OXYGEN SATURATION: 94 % | DIASTOLIC BLOOD PRESSURE: 63 MMHG | HEART RATE: 72 BPM | SYSTOLIC BLOOD PRESSURE: 146 MMHG | TEMPERATURE: 97.4 F | RESPIRATION RATE: 19 BRPM

## 2018-04-10 VITALS
OXYGEN SATURATION: 96 % | RESPIRATION RATE: 18 BRPM | SYSTOLIC BLOOD PRESSURE: 115 MMHG | DIASTOLIC BLOOD PRESSURE: 64 MMHG | HEART RATE: 67 BPM | TEMPERATURE: 97.6 F

## 2018-04-10 VITALS
TEMPERATURE: 97.7 F | DIASTOLIC BLOOD PRESSURE: 58 MMHG | SYSTOLIC BLOOD PRESSURE: 116 MMHG | RESPIRATION RATE: 18 BRPM | HEART RATE: 82 BPM | OXYGEN SATURATION: 95 %

## 2018-04-10 VITALS
HEART RATE: 76 BPM | DIASTOLIC BLOOD PRESSURE: 66 MMHG | RESPIRATION RATE: 19 BRPM | OXYGEN SATURATION: 94 % | SYSTOLIC BLOOD PRESSURE: 129 MMHG | TEMPERATURE: 97.8 F

## 2018-04-10 VITALS
SYSTOLIC BLOOD PRESSURE: 114 MMHG | RESPIRATION RATE: 19 BRPM | DIASTOLIC BLOOD PRESSURE: 59 MMHG | HEART RATE: 76 BPM | TEMPERATURE: 97.7 F | OXYGEN SATURATION: 96 %

## 2018-04-10 VITALS — OXYGEN SATURATION: 98 %

## 2018-04-10 LAB
A1AT SERPL-MCNC: 125 MG/DL (ref 100–190)
ALBUMIN SERPL-MCNC: 2.2 GM/DL (ref 3.4–5)
ALP SERPL-CCNC: 328 U/L (ref 45–117)
ALT SERPL-CCNC: 66 U/L (ref 10–53)
AST SERPL-CCNC: 119 U/L (ref 15–37)
BILIRUB INDIRECT SERPL-MCNC: 1 MG/DL (ref 0–0.8)
BILIRUB SERPL-MCNC: 1.4 MG/DL (ref 0.2–1)
BUN SERPL-MCNC: 2 MG/DL (ref 7–18)
CALCIUM SERPL-MCNC: 7.8 MG/DL (ref 8.5–10.1)
CHLORIDE SERPL-SCNC: 102 MEQ/L (ref 98–107)
CREAT SERPL-MCNC: 0.39 MG/DL (ref 0.5–1)
DIRECT BILIRUBIN ADULT: 0.4 MG/DL (ref 0–0.2)
GFR SERPLBLD BASED ON 1.73 SQ M-ARVRAT: 172 ML/MIN (ref 89–?)
GLUCOSE SERPL-MCNC: 126 MG/DL (ref 74–106)
HCO3 BLD-SCNC: 31.3 MEQ/L (ref 21–32)
PROT SERPL-MCNC: 4.8 GM/DL (ref 6.4–8.2)
SODIUM SERPL-SCNC: 140 MEQ/L (ref 136–145)
TRANSGLUTAMINASE IGG AB: <1.2 U/ML
TTG IGA SER IA-ACNC: <1.2 U/ML

## 2018-04-10 RX ADMIN — Medication SCH ML: at 09:17

## 2018-04-10 RX ADMIN — CHOLESTYRAMINE SCH GM: 4 POWDER, FOR SUSPENSION ORAL at 05:26

## 2018-04-10 RX ADMIN — ZOLPIDEM TARTRATE PRN MG: 10 TABLET, FILM COATED ORAL at 22:15

## 2018-04-10 RX ADMIN — PHENYTOIN SODIUM SCH MLS/HR: 50 INJECTION INTRAMUSCULAR; INTRAVENOUS at 18:33

## 2018-04-10 RX ADMIN — METHYLPREDNISOLONE SODIUM SUCCINATE SCH MG: 40 INJECTION, POWDER, FOR SOLUTION INTRAMUSCULAR; INTRAVENOUS at 09:16

## 2018-04-10 RX ADMIN — METHYLPREDNISOLONE SODIUM SUCCINATE SCH MG: 40 INJECTION, POWDER, FOR SOLUTION INTRAMUSCULAR; INTRAVENOUS at 21:13

## 2018-04-10 RX ADMIN — CIPROFLOXACIN SCH MLS/HR: 2 INJECTION, SOLUTION INTRAVENOUS at 00:55

## 2018-04-10 RX ADMIN — POTASSIUM CHLORIDE, DEXTROSE MONOHYDRATE AND SODIUM CHLORIDE SCH MLS/HR: 150; 5; 450 INJECTION, SOLUTION INTRAVENOUS at 13:31

## 2018-04-10 RX ADMIN — HYDROMORPHONE HYDROCHLORIDE PRN MG: 2 INJECTION INTRAMUSCULAR; INTRAVENOUS; SUBCUTANEOUS at 03:14

## 2018-04-10 RX ADMIN — ESCITALOPRAM OXALATE SCH MG: 10 TABLET, FILM COATED ORAL at 09:17

## 2018-04-10 RX ADMIN — IPRATROPIUM BROMIDE AND ALBUTEROL SULFATE SCH AMPULE: .5; 3 SOLUTION RESPIRATORY (INHALATION) at 08:00

## 2018-04-10 RX ADMIN — PANCRELIPASE SCH CAP: 24000; 76000; 120000 CAPSULE, DELAYED RELEASE PELLETS ORAL at 17:55

## 2018-04-10 RX ADMIN — CIPROFLOXACIN SCH MLS/HR: 2 INJECTION, SOLUTION INTRAVENOUS at 13:26

## 2018-04-10 RX ADMIN — PANTOPRAZOLE SODIUM SCH MG: 40 INJECTION, POWDER, FOR SOLUTION INTRAVENOUS at 05:25

## 2018-04-10 RX ADMIN — CHOLESTYRAMINE SCH GM: 4 POWDER, FOR SUSPENSION ORAL at 21:13

## 2018-04-10 RX ADMIN — PANTOPRAZOLE SODIUM SCH MG: 40 INJECTION, POWDER, FOR SOLUTION INTRAVENOUS at 17:54

## 2018-04-10 RX ADMIN — Medication SCH ML: at 21:00

## 2018-04-10 RX ADMIN — HYDROMORPHONE HYDROCHLORIDE PRN MG: 2 INJECTION INTRAMUSCULAR; INTRAVENOUS; SUBCUTANEOUS at 17:55

## 2018-04-10 RX ADMIN — ONDANSETRON PRN MG: 2 INJECTION, SOLUTION INTRAMUSCULAR; INTRAVENOUS at 16:57

## 2018-04-10 RX ADMIN — POTASSIUM CHLORIDE, DEXTROSE MONOHYDRATE AND SODIUM CHLORIDE SCH MLS/HR: 150; 5; 450 INJECTION, SOLUTION INTRAVENOUS at 03:19

## 2018-04-10 RX ADMIN — HYDROMORPHONE HYDROCHLORIDE PRN MG: 2 INJECTION INTRAMUSCULAR; INTRAVENOUS; SUBCUTANEOUS at 13:28

## 2018-04-10 RX ADMIN — HYDROMORPHONE HYDROCHLORIDE PRN MG: 2 INJECTION INTRAMUSCULAR; INTRAVENOUS; SUBCUTANEOUS at 09:17

## 2018-04-10 RX ADMIN — PHENYTOIN SODIUM SCH MLS/HR: 50 INJECTION INTRAMUSCULAR; INTRAVENOUS at 07:05

## 2018-04-10 RX ADMIN — CHOLESTYRAMINE SCH GM: 4 POWDER, FOR SUSPENSION ORAL at 13:23

## 2018-04-10 RX ADMIN — SODIUM CHLORIDE SCH MLS/HR: 900 INJECTION, SOLUTION INTRAVENOUS at 21:13

## 2018-04-10 RX ADMIN — HYDROMORPHONE HYDROCHLORIDE PRN MG: 2 INJECTION INTRAMUSCULAR; INTRAVENOUS; SUBCUTANEOUS at 22:15

## 2018-04-10 RX ADMIN — IPRATROPIUM BROMIDE AND ALBUTEROL SULFATE SCH AMPULE: .5; 3 SOLUTION RESPIRATORY (INHALATION) at 11:29

## 2018-04-10 RX ADMIN — SODIUM CHLORIDE SCH MLS/HR: 900 INJECTION, SOLUTION INTRAVENOUS at 13:26

## 2018-04-10 RX ADMIN — SODIUM CHLORIDE SCH MLS/HR: 900 INJECTION, SOLUTION INTRAVENOUS at 05:25

## 2018-04-10 NOTE — HHI.GIFU
Subjective


Remarks


Pt resting in bed


Complaining of continued diarrhea, denies blood in stool


Abdominal cramping associated with diarrhea


Denies nausea, vomiting


Tolerating PO


 (Suzie Roberts)





Objective


Vitals I&O





Vital Signs








  Date Time  Temp Pulse Resp B/P (MAP) Pulse Ox O2 Delivery O2 Flow Rate FiO2


 


4/10/18 12:00 97.8 76 19 129/66 (87) 94   


 


4/10/18 08:00 97.7 76 19 114/59 (77) 96   


 


4/10/18 03:44   16     


 


4/10/18 00:00 97.7 82 18 116/58 (77) 95   


 


4/9/18 20:00 97.7 66 17 120/60 (80) 94   


 


4/9/18 16:00 98.1 68 18 116/83 (94) 94   


 


4/9/18 14:30  77 18 122/60 (80) 94 Room Air  














I/O      


 


 4/9/18 4/9/18 4/9/18 4/10/18 4/10/18 4/10/18





 06:59 14:59 22:59 06:59 14:59 22:59


 


Intake Total 300 ml 1600 ml 780 ml 480 ml  


 


Balance 300 ml 1600 ml 780 ml 480 ml  


 


      


 


Intake Oral   480 ml 480 ml  


 


IV Total 300 ml 1000 ml 300 ml   


 


Other  600 ml    


 


# Voids 3  4 3  


 


# Bowel Movements 5  4   








Laboratory





Laboratory Tests








Test


  4/10/18


04:32 4/10/18


12:42


 


Total Bilirubin 1.4  


 


Direct Bilirubin 0.4  


 


Indirect Bilirubin 1.0  


 


Aspartate Amino Transf


(AST/SGOT) 119 


  


 


 


Alanine Aminotransferase


(ALT/SGPT) 66 


  


 


 


Alkaline Phosphatase 328  


 


Total Protein 4.8  


 


Albumin 2.2  


 


Blood Urea Nitrogen  2 


 


Creatinine  0.39 


 


Random Glucose  126 


 


Calcium Level  7.8 


 


Sodium Level  140 


 


Potassium Level  3.5 


 


Chloride Level  102 


 


Carbon Dioxide Level  31.3 


 


Anion Gap  7 


 


Estimat Glomerular Filtration


Rate 


  172 


 


 


Total Creatine Kinase  36 














 Date/Time


Source Procedure


Growth Status


 


 


 4/7/18 19:25


Stool Stool Cryptosporidium Exam - Final


NEGATIVE - NO CRYPTOSPORIDIUM ANTIGEN... Complete


 


 4/7/18 19:25


Stool Stool Giardia Antigen (GEO) - Final


NEGATIVE - NO GIARDIA ANTIGEN DETECTE... Complete








Imaging





Last Impressions








Cholangiopancreatography MRI 4/7/18 0000 Signed





Impressions: 





 Service Date/Time:  Saturday, April 7, 2018 14:36 - CONCLUSION:  1. 

Hepatomegaly 





 with very prominent hepatic steatosis. 2. Solitary smooth mild stricturing at 





 the intrahepatic biliary duct draining the posterior segment right lobe of the 





 liver. The distal branches of the posterior segment right lobe of the liver 

and 





 other distal branches all are the same caliber suggesting a significant 

stenosis 





 is not present. 3. Mild intrahepatic biliary duct dilatation which may reflect 





 reservoir phenomenon following cholecystectomy.     Familia Robison MD 


 


Chest X-Ray 4/6/18 1119 Signed





Impressions: 





 Service Date/Time:  Friday, April 6, 2018 11:28 - CONCLUSION:  No acute 





 cardiopulmonary process to explain current clinical symptoms.     Malachi Smith MD 


 


Abdomen/Pelvis CT 4/6/18 1119 Signed





Impressions: 





 Service Date/Time:  Friday, April 6, 2018 12:37 - CONCLUSION:  1. Acute on 





 chronic appendicitis suspected in the proper clinical setting; there is an 





 indurated appearing fluid-filled viscus that appears to be blind ending 





 posteromedial to the cecum. No free air seen. 2. Enlarged and severely fatty 





 infiltrated liver. 3. Right ovarian cyst.     Familia Mcgrath MD ADDENDUM:   





 Apparently patient has a normal white count and does not have other clinical 





 features typical for appendicitis. In looking the study over again, there is 





 potentially a portion of a normal appendix seen inferior to the cecum on 

series 





 601 image 68 which would also mitigate against appendicitis. The blind ending 





 pouch posteromedial to the cecum is best nonspecific but I believe abnormal, 





 could be a Meckel's diverticulum. There definitely seems to be some 

inflammatory 





 change of it and the adjacent distal ileum, series 601 image 96. Crohn's 

disease 





 with an adjacent abscess would also be conceivable but unlikely in the setting 





 of a normal white count.   Familia Mcgrath MD 








Physical Exam


HEENT: Normocephalic; atraumatic 


CHEST:  Even/unlabored 


CARDIAC:  RRR


ABDOMEN:  Soft, nontender, nondistended, bowel sounds active 


EXTREMITIES: No LE edema.


SKIN:  Normal; no rash; no jaundice.


CNS:  No focal deficits; alert and oriented times three


 (Suzie Roberts)





Assessment and Plan


Plan


Assessment


- Nausea/vomiting/diarrhea for few weeks-  Hx of  colitis, not certain what 

type. was diagnosed many yrs ago, 


  colonoscopy was done many yrs ago, hasn't been on tx for this, hasn't been 

following with GI, she presented 


  to the ED with few weeks hx of nausea, vomiting, bloody and mucusy diarrhea. 

She states she has lost 22 


  pounds since.  She has a history of open cholecystectomy and reports that she 

was told her appendix was 


  removed at that time.  


  CT abdomen and pelvis W IV contrast (4/6) --> At first radiology read it as 

acute on chronic appendicitis, an


  indurated appearing fluid-filled viscus that appears to be blind ending 

posteromedial to the cecum, no free air.


  Addendum noted that a portion of normal appendix was seen which could 

mitigate against appendicitis. The


  blind ending pouch posteromedial to the cecum is best nonspecific but 

believes to be abnormal, could be a 


  Meckel's diverticulum.  Seems to be some inflammatory change of it and the 

adjacent distal ileum. 


- LFTs are all elevated, bilirubin and alkaline phosphatase higher than 

transaminases. Obstructive pattern,  


  pt is s/p cholecystectomy. 


- History of pancreatitis with multiple pancreatic debridement- states 

pancreatitis was secondary to gallstones





Work up so far:


MRCP (4/7) --> Hepatomegaly with very prominent hepatic steatosis. Solitary 

smooth mild stricturing at the


  intrahepatic biliary duct draining the posterior segment right lobe of the 

liver. The distal branches of the 


  posterior segment right lobe of the liver and other distal branches are all 

the same caliber suggesting a 


  significant stenosis is not present. Mild intrahepatic biliary duct 

dilatation which may reflect reservoir


  phenomenon following cholecystectomy. 


EGD --> Normal upper endoscopy


Colonoscopy --> Normal terminal ileum. Minimal erythema in the cecum. Biopsy. 

Normal colonoscopy 


  otherwise. Biopsy was done from sigmoid for diarrhea. No clear reason for 

weight loss and other GI 


  symptoms. 


Stool negative for C. Diff and ova and parasites.


Liver Work up:


  Hepatitis panel negative. GIA negative. Ferritin-[id number] 


  ASMA, AMA, celiac panel, A1A, ceruloplasmin pending





(4/10) Pt tolerating heart healthy diet. Denies nausea, vomiting. Continued 

multiple episodes of diarrhea


  throughout the day, denies hematochezia and melena. Associated abdominal 

cramping with BMs.


  LFTs remain about the same today.  Liver work up pending as above.  


  GS following- ordered repeat CT abd/pelvis. Pt remains on Cipro and Flagyl. 





Plan:


Liver DELATORRE pending


Add IgG4


GS input appreciated- repeat CT abd/pelvis ordered 


Add stool enteric pathogens 


Colon biopsies pending


Cholestyramine 


Trial of pancreatic enzymes


Monitor LFTs


GILMA


Further recommendations based on findings of above and clinical course





Pt has been seen and examined by myself and Dr. Grimm and this note is 

written on his behalf 


 (Suzie Roberts)


Plan


Patient was seen and examined, agree with above-noted, still having diarrhea 

but less than before and no bleeding, we will wait for the remaining of the lab 

work, and the biopsy


 (Staci Grimm MD)











Suzie Roberts Apr 10, 2018 13:52


Staci Grimm MD Apr 10, 2018 19:21

## 2018-04-10 NOTE — HHI.PR
Subjective


Remarks


53 YOWF with COPD, SOB,Diverticulitis


Seen by surgery


Breathing better


On RA


Tolerates


had mild sob, better now





Objective


Vital Signs





Vital Signs








  Date Time  Temp Pulse Resp B/P (MAP) Pulse Ox O2 Delivery O2 Flow Rate FiO2


 


4/10/18 16:00 97.4 72 19 146/63 (90) 94   


 


4/10/18 12:00 97.8 76 19 129/66 (87) 94   


 


4/10/18 08:00 97.7 76 19 114/59 (77) 96   


 


4/10/18 03:44   16     


 


4/10/18 00:00 97.7 82 18 116/58 (77) 95   














I/O      


 


 4/9/18 4/9/18 4/9/18 4/10/18 4/10/18 4/10/18





 07:00 15:00 23:00 07:00 15:00 23:00


 


Intake Total 300 ml 1600 ml 780 ml 480 ml 1150 ml 840 ml


 


Balance 300 ml 1600 ml 780 ml 480 ml 1150 ml 840 ml


 


      


 


Intake Oral   480 ml 480 ml  840 ml


 


IV Total 300 ml 1000 ml 300 ml  1150 ml 


 


Other  600 ml    


 


# Voids 3  4 3  9


 


# Bowel Movements 5  4   








Result Diagram:  


4/7/18 0547                                                                    

            4/10/18 1242





Objective Remarks


GENERAL: MBMN WF,NAD


SKIN: Warm and dry.


HEAD: Normocephalic.


EYES: No scleral icterus. No injection or drainage. 


NECK: Supple, trachea midline. No JVD or lymphadenopathy.


CARDIOVASCULAR: Regular rate and rhythm without murmurs, gallops, or rubs. 


RESPIRATORY: Breath sounds equal bilaterally. No accessory muscle use.


GASTROINTESTINAL: Abdomen soft, non-tender, nondistended. 


MUSCULOSKELETAL: No cyanosis, or edema. 


BACK: Nontender without obvious deformity. No CVA tenderness.








A/P


Assessment and Plan


IMPRESSION:


1.  Shortness of Breath and Hypoxia, improving


2.  Chronic obstructive pulmonary disease exacerbation.


3.  Diverticulitis.


4.  Anxiety and depression.


 





PLAN:





Aerosol nebs


Cont Abx


Supplement 02


DW Pt and her .


Stable from pulm standpoint











Sonny Nagy MD Apr 10, 2018 20:37

## 2018-04-10 NOTE — HHI.PR
Subjective


Remarks


NPO for ERCP, slept well. Denies N/V or Diarrhea.





Objective





Vital Signs








  Date Time  Temp Pulse Resp B/P (MAP) Pulse Ox O2 Delivery O2 Flow Rate FiO2


 


4/10/18 03:44   16     


 


4/10/18 00:00 97.7 82 18 116/58 (77) 95   


 


4/9/18 20:00 97.7 66 17 120/60 (80) 94   


 


4/9/18 16:00 98.1 68 18 116/83 (94) 94   


 


4/9/18 14:30  77 18 122/60 (80) 94 Room Air  


 


4/9/18 08:00 97.9 90 18 96/51 (66) 94   














I/O      


 


 4/9/18 4/9/18 4/9/18 4/10/18 4/10/18 4/10/18





 07:00 15:00 23:00 07:00 15:00 23:00


 


Intake Total 300 ml 1600 ml 780 ml 480 ml  


 


Balance 300 ml 1600 ml 780 ml 480 ml  


 


      


 


Intake Oral   480 ml 480 ml  


 


IV Total 300 ml 1000 ml 300 ml   


 


Other  600 ml    


 


# Voids 3  4 3  


 


# Bowel Movements 5  4   








Result Diagram:  


4/7/18 0547                                                                    

            4/7/18 0606





Other Results





Laboratory Tests








Test


  4/7/18


19:12 4/7/18


19:25 4/8/18


13:32 4/10/18


04:32


 


Total Bilirubin


  


  


  1.8 MG/DL


(0.2-1.0) 1.4 MG/DL


(0.2-1.0)


 


Direct Bilirubin


  


  


  1.2 MG/DL


(0.0-0.2) 0.4 MG/DL


(0.0-0.2)


 


Aspartate Amino Transf


(AST/SGOT) 


  


  117 U/L


(15-37) 119 U/L


(15-37)


 


Alanine Aminotransferase


(ALT/SGPT) 


  


  65 U/L (10-53) 


  66 U/L (10-53) 


 


 


Alkaline Phosphatase


  


  


  390 U/L


() 328 U/L


()


 


Total Protein


  


  


  5.7 GM/DL


(6.4-8.2) 4.8 GM/DL


(6.4-8.2)


 


Albumin


  


  


  2.8 GM/DL


(3.4-5.0) 2.2 GM/DL


(3.4-5.0)


 


Indirect Bilirubin


  


  


  


  1.0 MG/DL


(0.0-0.8)








Objective Remarks


GENERAL: Alert, Oriented NAD. 


SKIN: Warm and dry.


HEAD: Normocephalic.


EYES: No scleral icterus. No injection or drainage. 


NECK: Supple, trachea midline. No JVD or lymphadenopathy.


CARDIOVASCULAR: Regular rate and rhythm without murmurs, gallops, or rubs. 


RESPIRATORY: Breath sounds equal bilaterally. No accessory muscle use.


GASTROINTESTINAL: Abdomen soft, nondistended, hyperactive  


MUSCULOSKELETAL: No cyanosis, or edema. 


BACK: Nontender without obvious deformity. No CVA tenderness.


Medications and IVs





Current Medications








 Medications


  (Trade)  Dose


 Ordered  Sig/Sahil


 Route  Start Time


 Stop Time Status Last Admin


 


  (NS Flush)  2 ml  UNSCH  PRN


 IV FLUSH  4/6/18 11:30


    4/6/18 18:49


 


 


 Sodium Chloride  1,000 ml @ 


 100 mls/hr  Q10H


 IV  4/6/18 14:33


    4/6/18 15:44


 


 


  (NS Flush)  2 ml  UNSCH  PRN


 IV FLUSH  4/6/18 14:45


     


 


 


  (NS Flush)  2 ml  BID


 IV FLUSH  4/6/18 21:00


    4/6/18 23:10


 


 


  (Zofran Inj)  4 mg  Q6H  PRN


 IVP  4/6/18 14:45


    4/7/18 04:39


 


 


  (Narcan Inj)  0.4 mg  UNSCH  PRN


 IV PUSH  4/6/18 14:45


     


 


 


 Ciprofloxacin/


 Dextrose  200 ml @ 


 200 mls/hr  Q12H


 IV  4/7/18 01:00


    4/10/18 00:55


 


 


 Metronidazole  100 ml @ 


 100 mls/hr  Q8HR


 IV  4/6/18 22:00


    4/10/18 05:25


 


 


  (Dilaudid Pf Inj)  1 mg  Q4H  PRN


 IV PUSH  4/6/18 14:45


    4/10/18 03:14


 


 


  (Duoneb Neb)  1 ampule  Q6HR WHILE AWAKE  NEB


 NEB  4/6/18 20:00


    4/9/18 09:21


 


 


  (Duoneb Neb)  1 ampule  Q2HR NEB  PRN


 NEB  4/6/18 15:15


     


 


 


  (SoluMEDROL INJ)  40 mg  Q12HR


 IV PUSH  4/6/18 21:00


    4/9/18 21:15


 


 


  (Protonix Inj)  40 mg  Q12H


 IV PUSH  4/6/18 17:00


    4/10/18 05:25


 


 


 Potassium


 Chloride/Dextrose/


 Sod Cl  1,000 ml @ 


 100 mls/hr  Q10H


 IV  4/6/18 20:45


    4/10/18 03:19


 


 


  (Lexapro)  10 mg  DAILY


 PO  4/7/18 09:00


    4/9/18 08:36


 


 


  (Ambien)  10 mg  HS  PRN


 PO  4/6/18 22:45


    4/9/18 21:23


 


 


  (Tylenol)  650 mg  Q6H  PRN


 PO  4/7/18 19:30


    4/7/18 20:05


 


 


  (Questran 4 Gm


 Pkt)  4 gm  Q8HR


 PO  4/8/18 14:00


    4/9/18 21:15


 


 


 Lactated Ringer's  1,000 ml @ 


 30 mls/hr  Q24H PRN


 IV  4/9/18 04:45


 4/12/18 04:44   


 


 


  (Romazicon Inj)  0.2 mg  Q1M  PRN


 IV PUSH  4/9/18 09:15


     


 


 


  (Ativan)  1 mg  Q4H  PRN


 PO  4/9/18 09:15


     


 


 


  (Ativan Inj)  1 mg  Q4H  PRN


 IV PUSH  4/9/18 09:15


     


 


 


  (Ativan)  2 mg  Q2H  PRN


 PO  4/9/18 09:15


     


 


 


  (Ativan Inj)  2 mg  Q2H  PRN


 IV PUSH  4/9/18 09:15


     


 


 


  (Ativan Inj)  2 mg  Q1H  PRN


 IV PUSH  4/9/18 09:15


     


 


 


  (Ativan Inj)  2 mg  Q15M  PRN


 IV PUSH  4/9/18 09:15


     


 


 


 Miscellaneous


 Information  ALL


 NURSING


 DEPARTME...  UNSCH  PRN


 .XX  4/9/18 15:00


 4/10/18 14:59   


 











Assessment and Plan


Problem List:  


(1) Diarrhea


ICD Codes:  R19.7 - Diarrhea, unspecified


Status:  Acute


Plan:  Cont to GI recs. ERCP scheduled for today


severe hepatics steatosis, questionable narrowing  DELATORRE pending. 





(2) Ulcerative colitis


ICD Codes:  K51.90 - Ulcerative colitis, unspecified, without complications


Status:  Acute


Plan:  Follow GI recs, cipro, flagyl.





(3) COPD with acute exacerbation


ICD Codes:  J44.1 - Chronic obstructive pulmonary disease with (acute) 

exacerbation


Status:  Acute


Plan:  Stable on R/A


Pulmonary following





Discharge Planning


Home at DC





Problem Qualifiers





(1) Ulcerative colitis:  


Qualified Codes:  K51.919 - Ulcerative colitis, unspecified with unspecified 

complications








Radha Garbiel Apr 10, 2018 07:53

## 2018-04-10 NOTE — RADRPT
EXAM DATE/TIME:  04/10/2018 17:30 

 

[facility] COMPARISON:     

CT ABDOMEN & PELVIS W CONTRAST, April 06, 2018, 12:37.

 

 

INDICATIONS :     

Abdomen pain.

                      

 

IV CONTRAST:     

100 cc Omnipaque 350 (iohexol) IV 

 

 

ORAL CONTRAST:      

Prescribed oral contrast ingested.

                      

 

RADIATION DOSE:     

11.58 CTDIvol (mGy) 

 

 

MEDICAL HISTORY :     

Cardiovascular disease. Chronic obstructive pulmonary disease. 

 

SURGICAL HISTORY :      

Cholecystectomy. Appendectomy.Hernia repair.

 

ENCOUNTER:      

Initial

 

ACUITY:      

1 day

 

PAIN SCALE:      

5/10

 

LOCATION:       

Bilateral  abdomen

 

TECHNIQUE:     

Volumetric scanning of the abdomen and pelvis was performed.  Using automated exposure control and ad
justment of the mA and/or kV according to patient size, radiation dose was kept as low as reasonably 
achievable to obtain optimal diagnostic quality images.  DICOM format image data is available electro
nically for review and comparison.  

 

FINDINGS:     

 

LOWER LUNGS:     

Tiny bilateral pleural effusions are noted.

 

LIVER:     

The liver is enlarged and demonstrates diffuse fatty infiltration. No focal mass is noted.  There is 
no dilation of the biliary tree.  Status post cholecystectomy. No calcified gallstones.

 

SPLEEN:     

Normal size without lesion.

 

PANCREAS:     

Within normal limits.

 

KIDNEYS:     

Normal in size and shape.  There is no mass, stone or hydronephrosis.

 

ADRENAL GLANDS:     

Within normal limits.

 

VASCULAR:     

There is no aortic aneurysm.

 

BOWEL/MESENTERY:     

The stomach, small bowel, and colon demonstrate no acute abnormality.  There is no free intraperitone
al air or fluid.

 

ABDOMINAL WALL:     

Ventral abdominal wall hernia containing only fat is noted.

 

RETROPERITONEUM:     

There is no lymphadenopathy. 

 

BLADDER:     

No wall thickening or mass. 

 

REPRODUCTIVE:     

Within normal limits. Free fluid is noted within the cul-de-sac. 2.7 cm right ovarian cyst is noted.

 

INGUINAL:     

There is no lymphadenopathy or hernia. 

 

MUSCULOSKELETAL:     

Scoliosis and degenerative changes of the thoraco-lumbar spine are noted.

 

CONCLUSION:     

1. Markedly enlarged fatty liver. 

2. Free fluid within the cul-de-sac.

3. Ventral abdominal wall hernia containing only fat. 

4. 2.7 cm right ovarian cyst. 

5. Tiny bilateral pleural effusions.

6. Scoliosis and degenerative changes of the thoraco-lumbar spine. 

 

 

 Abhi Matthews MD on April 10, 2018 at 17:48           

Board Certified Radiologist.

 This report was verified electronically.

## 2018-04-10 NOTE — HHI.PR
__________________________________________________





Subjective


Subjective Notes


Colonoscopy showed some cecal erythema no other findings.  She is feeling 

better. LFTs improving.





Objective


Vitals/I&O





Vital Signs








  Date Time  Temp Pulse Resp B/P (MAP) Pulse Ox O2 Delivery O2 Flow Rate FiO2


 


4/10/18 08:00 97.7 76 19 114/59 (77) 96   


 


4/9/18 14:30      Room Air  


 


4/8/18 08:25        21


 


4/6/18 20:05       2.00 








Labs





Laboratory Tests








Test


  4/10/18


04:32


 


Total Bilirubin 1.4 


 


Direct Bilirubin 0.4 


 


Indirect Bilirubin 1.0 


 


Aspartate Amino Transf


(AST/SGOT) 119 


 


 


Alanine Aminotransferase


(ALT/SGPT) 66 


 


 


Alkaline Phosphatase 328 


 


Total Protein 4.8 


 


Albumin 2.2 














 Date/Time


Source Procedure


Growth Status


 


 


 4/7/18 19:25


Stool Stool Cryptosporidium Exam - Final


NEGATIVE - NO CRYPTOSPORIDIUM ANTIGEN... Complete


 


 4/7/18 19:25


Stool Stool Giardia Antigen (GEO) - Final


NEGATIVE - NO GIARDIA ANTIGEN DETECTE... Complete








Radiology





Last Impressions








Chest X-Ray 4/6/18 1119 Signed





Impressions: 





 Service Date/Time:  Friday, April 6, 2018 11:28 - CONCLUSION:  No acute 





 cardiopulmonary process to explain current clinical symptoms.     Malachi Smith MD 


 


Abdomen/Pelvis CT 4/6/18 1119 Signed





Impressions: 





 Service Date/Time:  Friday, April 6, 2018 12:37 - CONCLUSION:  1. Acute on 





 chronic appendicitis suspected in the proper clinical setting; there is an 





 indurated appearing fluid-filled viscus that appears to be blind ending 





 posteromedial to the cecum. No free air seen. 2. Enlarged and severely fatty 





 infiltrated liver. 3. Right ovarian cyst.     Familia Mcgrath MD ADDENDUM:   





 Apparently patient has a normal white count and does not have other clinical 





 features typical for appendicitis. In looking the study over again, there is 





 potentially a portion of a normal appendix seen inferior to the cecum on 

series 





 601 image 68 which would also mitigate against appendicitis. The blind ending 





 pouch posteromedial to the cecum is best nonspecific but I believe abnormal, 





 could be a Meckel's diverticulum. There definitely seems to be some 

inflammatory 





 change of it and the adjacent distal ileum, series 601 image 96. Crohn's 

disease 





 with an adjacent abscess would also be conceivable but unlikely in the setting 





 of a normal white count.   Familia Mcgrath MD 








Narrative Exam


NAD


Abd: nontender, large right subcostal scar





A/P


Assessment and Plan


54 yo F with ? intraabdominal abscess, bloody diarrhea, increased LFTs with 

hepatomegaly.  





Colonoscopy showed cecal erythema. 





MRCP shows hepatomegaly with hepatic steatosis.  Stricturing of one branch of 

right hepatic duct.  She relates h/o multiple pancreatic debridements in late 

80s with open abdomen in hospital 6 weeks.





? abscess or Meckels.  Repeat CT a/p.











Bradley Strauss MD Apr 10, 2018 11:41

## 2018-04-11 VITALS
SYSTOLIC BLOOD PRESSURE: 97 MMHG | HEART RATE: 87 BPM | DIASTOLIC BLOOD PRESSURE: 53 MMHG | OXYGEN SATURATION: 96 % | TEMPERATURE: 97.7 F | RESPIRATION RATE: 18 BRPM

## 2018-04-11 VITALS
HEART RATE: 95 BPM | TEMPERATURE: 97 F | SYSTOLIC BLOOD PRESSURE: 116 MMHG | RESPIRATION RATE: 17 BRPM | OXYGEN SATURATION: 92 % | DIASTOLIC BLOOD PRESSURE: 58 MMHG

## 2018-04-11 VITALS
TEMPERATURE: 97.7 F | RESPIRATION RATE: 16 BRPM | HEART RATE: 84 BPM | OXYGEN SATURATION: 97 % | DIASTOLIC BLOOD PRESSURE: 60 MMHG | SYSTOLIC BLOOD PRESSURE: 123 MMHG

## 2018-04-11 VITALS
TEMPERATURE: 97.1 F | DIASTOLIC BLOOD PRESSURE: 60 MMHG | SYSTOLIC BLOOD PRESSURE: 112 MMHG | OXYGEN SATURATION: 93 % | HEART RATE: 80 BPM | RESPIRATION RATE: 17 BRPM

## 2018-04-11 VITALS
DIASTOLIC BLOOD PRESSURE: 92 MMHG | HEART RATE: 104 BPM | TEMPERATURE: 97.1 F | RESPIRATION RATE: 17 BRPM | OXYGEN SATURATION: 95 % | SYSTOLIC BLOOD PRESSURE: 115 MMHG

## 2018-04-11 LAB
ALBUMIN SERPL-MCNC: 2.1 GM/DL (ref 3.4–5)
ALP SERPL-CCNC: 293 U/L (ref 45–117)
ALT SERPL-CCNC: 52 U/L (ref 10–53)
AST SERPL-CCNC: 67 U/L (ref 15–37)
BASOPHILS # BLD AUTO: 0 TH/MM3 (ref 0–0.2)
BASOPHILS NFR BLD: 0.2 % (ref 0–2)
BILIRUB INDIRECT SERPL-MCNC: 0.3 MG/DL (ref 0–0.8)
BILIRUB SERPL-MCNC: 1 MG/DL (ref 0.2–1)
BUN SERPL-MCNC: 2 MG/DL (ref 7–18)
CALCIUM SERPL-MCNC: 7.9 MG/DL (ref 8.5–10.1)
CHLORIDE SERPL-SCNC: 107 MEQ/L (ref 98–107)
CREAT SERPL-MCNC: 0.35 MG/DL (ref 0.5–1)
DIRECT BILIRUBIN ADULT: 0.7 MG/DL (ref 0–0.2)
EOSINOPHIL # BLD: 0 TH/MM3 (ref 0–0.4)
EOSINOPHIL NFR BLD: 0.1 % (ref 0–4)
ERYTHROCYTE [DISTWIDTH] IN BLOOD BY AUTOMATED COUNT: 16.8 % (ref 11.6–17.2)
GFR SERPLBLD BASED ON 1.73 SQ M-ARVRAT: 195 ML/MIN (ref 89–?)
GLUCOSE SERPL-MCNC: 90 MG/DL (ref 74–106)
HCO3 BLD-SCNC: 30.6 MEQ/L (ref 21–32)
HCT VFR BLD CALC: 33.8 % (ref 35–46)
HGB BLD-MCNC: 11.6 GM/DL (ref 11.6–15.3)
LYMPHOCYTES # BLD AUTO: 1.1 TH/MM3 (ref 1–4.8)
LYMPHOCYTES NFR BLD AUTO: 20 % (ref 9–44)
MCH RBC QN AUTO: 35.9 PG (ref 27–34)
MCHC RBC AUTO-ENTMCNC: 34.2 % (ref 32–36)
MCV RBC AUTO: 105 FL (ref 80–100)
MITOCHONDRIA AB SER QL: (no result) U (ref ?–20)
MONOCYTE #: 0.3 TH/MM3 (ref 0–0.9)
MONOCYTES NFR BLD: 6.4 % (ref 0–8)
NEUTROPHILS # BLD AUTO: 3.9 TH/MM3 (ref 1.8–7.7)
NEUTROPHILS NFR BLD AUTO: 73.3 % (ref 16–70)
PLATELET # BLD: 159 TH/MM3 (ref 150–450)
PMV BLD AUTO: 9.4 FL (ref 7–11)
PROT SERPL-MCNC: 4.6 GM/DL (ref 6.4–8.2)
RBC # BLD AUTO: 3.22 MIL/MM3 (ref 4–5.3)
SODIUM SERPL-SCNC: 144 MEQ/L (ref 136–145)
WBC # BLD AUTO: 5.2 TH/MM3 (ref 4–11)

## 2018-04-11 RX ADMIN — PHENYTOIN SODIUM SCH MLS/HR: 50 INJECTION INTRAMUSCULAR; INTRAVENOUS at 11:33

## 2018-04-11 RX ADMIN — PANCRELIPASE SCH CAP: 24000; 76000; 120000 CAPSULE, DELAYED RELEASE PELLETS ORAL at 17:59

## 2018-04-11 RX ADMIN — SODIUM CHLORIDE SCH MLS/HR: 900 INJECTION, SOLUTION INTRAVENOUS at 20:45

## 2018-04-11 RX ADMIN — POTASSIUM CHLORIDE, DEXTROSE MONOHYDRATE AND SODIUM CHLORIDE SCH MLS/HR: 150; 5; 450 INJECTION, SOLUTION INTRAVENOUS at 10:45

## 2018-04-11 RX ADMIN — PHENYTOIN SODIUM SCH MLS/HR: 50 INJECTION INTRAMUSCULAR; INTRAVENOUS at 02:06

## 2018-04-11 RX ADMIN — PANTOPRAZOLE SODIUM SCH MG: 40 INJECTION, POWDER, FOR SOLUTION INTRAVENOUS at 15:54

## 2018-04-11 RX ADMIN — PANCRELIPASE SCH CAP: 24000; 76000; 120000 CAPSULE, DELAYED RELEASE PELLETS ORAL at 12:14

## 2018-04-11 RX ADMIN — SODIUM CHLORIDE SCH MLS/HR: 900 INJECTION, SOLUTION INTRAVENOUS at 04:31

## 2018-04-11 RX ADMIN — PANTOPRAZOLE SODIUM SCH MG: 40 INJECTION, POWDER, FOR SOLUTION INTRAVENOUS at 04:31

## 2018-04-11 RX ADMIN — SODIUM CHLORIDE SCH MLS/HR: 900 INJECTION, SOLUTION INTRAVENOUS at 13:03

## 2018-04-11 RX ADMIN — CIPROFLOXACIN SCH MLS/HR: 2 INJECTION, SOLUTION INTRAVENOUS at 02:03

## 2018-04-11 RX ADMIN — PANCRELIPASE SCH CAP: 24000; 76000; 120000 CAPSULE, DELAYED RELEASE PELLETS ORAL at 08:40

## 2018-04-11 RX ADMIN — Medication SCH ML: at 08:40

## 2018-04-11 RX ADMIN — HYDROMORPHONE HYDROCHLORIDE PRN MG: 2 INJECTION INTRAMUSCULAR; INTRAVENOUS; SUBCUTANEOUS at 11:48

## 2018-04-11 RX ADMIN — POTASSIUM CHLORIDE, DEXTROSE MONOHYDRATE AND SODIUM CHLORIDE SCH MLS/HR: 150; 5; 450 INJECTION, SOLUTION INTRAVENOUS at 02:03

## 2018-04-11 RX ADMIN — CHOLESTYRAMINE SCH GM: 4 POWDER, FOR SUSPENSION ORAL at 13:03

## 2018-04-11 RX ADMIN — ESCITALOPRAM OXALATE SCH MG: 10 TABLET, FILM COATED ORAL at 08:40

## 2018-04-11 RX ADMIN — Medication SCH ML: at 20:46

## 2018-04-11 RX ADMIN — ONDANSETRON PRN MG: 2 INJECTION, SOLUTION INTRAMUSCULAR; INTRAVENOUS at 15:53

## 2018-04-11 RX ADMIN — METHYLPREDNISOLONE SODIUM SUCCINATE SCH MG: 40 INJECTION, POWDER, FOR SOLUTION INTRAMUSCULAR; INTRAVENOUS at 08:40

## 2018-04-11 RX ADMIN — POTASSIUM CHLORIDE, DEXTROSE MONOHYDRATE AND SODIUM CHLORIDE SCH MLS/HR: 150; 5; 450 INJECTION, SOLUTION INTRAVENOUS at 17:59

## 2018-04-11 RX ADMIN — CHOLESTYRAMINE SCH GM: 4 POWDER, FOR SUSPENSION ORAL at 20:45

## 2018-04-11 RX ADMIN — CIPROFLOXACIN SCH MLS/HR: 2 INJECTION, SOLUTION INTRAVENOUS at 12:13

## 2018-04-11 RX ADMIN — HYDROMORPHONE HYDROCHLORIDE PRN MG: 2 INJECTION INTRAMUSCULAR; INTRAVENOUS; SUBCUTANEOUS at 20:41

## 2018-04-11 RX ADMIN — METHYLPREDNISOLONE SODIUM SUCCINATE SCH MG: 40 INJECTION, POWDER, FOR SOLUTION INTRAMUSCULAR; INTRAVENOUS at 20:41

## 2018-04-11 RX ADMIN — CHOLESTYRAMINE SCH GM: 4 POWDER, FOR SUSPENSION ORAL at 04:31

## 2018-04-11 RX ADMIN — ZOLPIDEM TARTRATE PRN MG: 10 TABLET, FILM COATED ORAL at 20:41

## 2018-04-11 RX ADMIN — HYDROMORPHONE HYDROCHLORIDE PRN MG: 2 INJECTION INTRAMUSCULAR; INTRAVENOUS; SUBCUTANEOUS at 15:54

## 2018-04-11 NOTE — HHI.GIFU
Subjective


Remarks


Pt resting in bed


She states she got good sleep last night and just woke up


She has felt her stomach seems to be somewhat better since being started on 

Creon yesterday


Diarrhea seems to be slowing down 


 (Suzie Roberts)





Objective


Vitals I&O





Vital Signs








  Date Time  Temp Pulse Resp B/P (MAP) Pulse Ox O2 Delivery O2 Flow Rate FiO2


 


4/11/18 08:00 97.1 104 17 115/92 (100) 95   


 


4/11/18 04:16 97.7 87 18 97/53 (68) 96   


 


4/10/18 23:10   20     


 


4/10/18 20:45     98   21


 


4/10/18 20:00 97.6 67 18 115/64 (81) 96   


 


4/10/18 16:00 97.4 72 19 146/63 (90) 94   














I/O      


 


 4/10/18 4/10/18 4/10/18 4/11/18 4/11/18 4/11/18





 07:00 15:00 23:00 07:00 15:00 23:00


 


Intake Total 480 ml 1150 ml 840 ml 780 ml  


 


Balance 480 ml 1150 ml 840 ml 780 ml  


 


      


 


Intake Oral 480 ml  840 ml 780 ml  


 


IV Total  1150 ml    


 


# Voids 3  9 3  








Laboratory





Laboratory Tests








Test


  4/10/18


12:42 4/10/18


14:39 4/11/18


09:00


 


Blood Urea Nitrogen 2   2 


 


Creatinine 0.39   0.35 


 


Random Glucose 126   90 


 


Calcium Level 7.8   7.9 


 


Sodium Level 140   144 


 


Potassium Level 3.5   3.2 


 


Chloride Level 102   107 


 


Carbon Dioxide Level 31.3   30.6 


 


Anion Gap 7   6 


 


Estimat Glomerular Filtration


Rate 172 


  


  195 


 


 


Total Creatine Kinase 36   


 


White Blood Count   5.2 


 


Red Blood Count   3.22 


 


Hemoglobin   11.6 


 


Hematocrit   33.8 


 


Mean Corpuscular Volume   105.0 


 


Mean Corpuscular Hemoglobin   35.9 


 


Mean Corpuscular Hemoglobin


Concent 


  


  34.2 


 


 


Red Cell Distribution Width   16.8 


 


Platelet Count   159 


 


Mean Platelet Volume   9.4 


 


Neutrophils (%) (Auto)   73.3 


 


Lymphocytes (%) (Auto)   20.0 


 


Monocytes (%) (Auto)   6.4 


 


Eosinophils (%) (Auto)   0.1 


 


Basophils (%) (Auto)   0.2 


 


Neutrophils # (Auto)   3.9 


 


Lymphocytes # (Auto)   1.1 


 


Monocytes # (Auto)   0.3 


 


Eosinophils # (Auto)   0.0 


 


Basophils # (Auto)   0.0 


 


CBC Comment   DIFF FINAL 


 


Differential Comment    














 Date/Time


Source Procedure


Growth Status


 


 


 4/11/18 11:37


Stool Stool 


Pending Received








Imaging





Last Impressions








Abdomen/Pelvis CT 4/10/18 0000 Signed





Impressions: 





 Service Date/Time:  Tuesday, April 10, 2018 17:30 - CONCLUSION:  1. Markedly 





 enlarged fatty liver.  2. Free fluid within the cul-de-sac. 3. Ventral 

abdominal 





 wall hernia containing only fat.  4. 2.7 cm right ovarian cyst.  5. Tiny 





 bilateral pleural effusions. 6. Scoliosis and degenerative changes of the 





 thoraco-lumbar spine.     Abhi Matthews MD 


 


Cholangiopancreatography MRI 4/7/18 0000 Signed





Impressions: 





 Service Date/Time:  Saturday, April 7, 2018 14:36 - CONCLUSION:  1. 

Hepatomegaly 





 with very prominent hepatic steatosis. 2. Solitary smooth mild stricturing at 





 the intrahepatic biliary duct draining the posterior segment right lobe of the 





 liver. The distal branches of the posterior segment right lobe of the liver 

and 





 other distal branches all are the same caliber suggesting a significant 

stenosis 





 is not present. 3. Mild intrahepatic biliary duct dilatation which may reflect 





 reservoir phenomenon following cholecystectomy.     Familia Robison MD 


 


Chest X-Ray 4/6/18 1119 Signed





Impressions: 





 Service Date/Time:  Friday, April 6, 2018 11:28 - CONCLUSION:  No acute 





 cardiopulmonary process to explain current clinical symptoms.     Malachi Smith MD 








Physical Exam


HEENT: Normocephalic; atraumatic 


CHEST:  Even/unlabored 


CARDIAC:  RRR


ABDOMEN:  Soft, nontender, nondistended, bowel sounds active 


EXTREMITIES: No LE edema.


SKIN:  Normal; no rash; no jaundice.


CNS:  No focal deficits; alert and oriented times three


 (Suzie Roberts)





Assessment and Plan


Plan


Assessment


- Nausea/vomiting/diarrhea for few weeks-  Hx of  colitis, not certain what 

type. was diagnosed many yrs ago, 


  colonoscopy was done many yrs ago, hasn't been on tx for this, hasn't been 

following with GI, she presented 


  to the ED with few weeks hx of nausea, vomiting, bloody and mucusy diarrhea. 

She states she has lost 22 


  pounds since.  She has a history of open cholecystectomy and reports that she 

was told her appendix was 


  removed at that time.  


  CT abdomen and pelvis W IV contrast (4/6) --> At first radiology read it as 

acute on chronic appendicitis, an


  indurated appearing fluid-filled viscus that appears to be blind ending 

posteromedial to the cecum, no free air.


  Addendum noted that a portion of normal appendix was seen which could 

mitigate against appendicitis. The


  blind ending pouch posteromedial to the cecum is best nonspecific but 

believes to be abnormal, could be a 


  Meckel's diverticulum.  Seems to be some inflammatory change of it and the 

adjacent distal ileum. 


- LFTs are all elevated, bilirubin and alkaline phosphatase higher than 

transaminases. Obstructive pattern,  


  pt is s/p cholecystectomy. 


- History of pancreatitis with multiple pancreatic debridement- states 

pancreatitis was secondary to gallstones





Work up so far:


MRCP (4/7) --> Hepatomegaly with very prominent hepatic steatosis. Solitary 

smooth mild stricturing at the


  intrahepatic biliary duct draining the posterior segment right lobe of the 

liver. The distal branches of the 


  posterior segment right lobe of the liver and other distal branches are all 

the same caliber suggesting a 


  significant stenosis is not present. Mild intrahepatic biliary duct 

dilatation which may reflect reservoir


  phenomenon following cholecystectomy. 


EGD --> Normal upper endoscopy


Colonoscopy --> Normal terminal ileum. Minimal erythema in the cecum. Biopsy. 

Normal colonoscopy 


  otherwise. Biopsy was done from sigmoid for diarrhea. No clear reason for 

weight loss and other GI 


  symptoms. 


Stool negative for C. Diff and ova and parasites.


Liver Work up:


  Hepatitis panel negative. GIA, ASMA negative. [id number] Celiac panel WNL. 

[id number]


  AMA, ceruloplasmin pending





(4/10) Pt tolerating heart healthy diet. Denies nausea, vomiting. Continued 

multiple episodes of diarrhea


  throughout the day, denies hematochezia and melena. Associated abdominal 

cramping with BMs.


  LFTs remain about the same today.  Liver work up pending as above.  


  GS following- ordered repeat CT abd/pelvis. Pt remains on Cipro and Flagyl. 





(4/11) Pt reports that her diarrhea seems to be improving some.  Slept well 

last night, just woke up


  prior to my exam so has not eaten yet today. She is planning on having some 

soup. 


  Enteric pathogens stool pending. IGG4, colon biopsies pending. 


  No repeat LFTs from today, will add on now. 


  Repeat CT abdomen and pelvis did not show any intraabdominal abscess so GS 

has signed off.





Plan:


Ceruloplasmin and AMA pending


IgG4 pending


Stool enteric pathogens pending


Colon biopsies pending


Cholestyramine 


Trial pancreatic enzymes


Monitor LFTs


GILMA


Further recommendations based on findings of above and clinical course





Pt has been seen and examined by myself and Dr. Grimm and this note is 

written on his behalf 


 (Suzie Roberts)


Plan


Patient was seen and examined, agree with above-noted, she had good night where 

she did not have diarrhea but then she started having diarrhea again, he said 

that she has diarrhea all her life since she had her gallbladder which I think 

it's a contributing factor and possibly the reason for it


I think patient can be treated symptomatically with antidiarrhea such as 

Imodium and and we can follow-up on these labs as an outpatient in 1 week


 (Staci Grimm MD)











Suzie Roberts Apr 11, 2018 12:20


Staci Grimm MD Apr 11, 2018 13:15

## 2018-04-11 NOTE — HHI.PR
__________________________________________________





Subjective


Subjective Notes


Continues to improve.  Had repeat CT a/p yesterday.





Objective


Vitals/I&O





Vital Signs








  Date Time  Temp Pulse Resp B/P (MAP) Pulse Ox O2 Delivery O2 Flow Rate FiO2


 


4/11/18 08:00 97.1 104 17 115/92 (100) 95   


 


4/10/18 20:45        21


 


4/9/18 14:30      Room Air  








Labs





Laboratory Tests








Test


  4/10/18


12:42 4/10/18


14:39


 


Blood Urea Nitrogen 2  


 


Creatinine 0.39  


 


Random Glucose 126  


 


Calcium Level 7.8  


 


Sodium Level 140  


 


Potassium Level 3.5  


 


Chloride Level 102  


 


Carbon Dioxide Level 31.3  


 


Anion Gap 7  


 


Estimat Glomerular Filtration


Rate 172 


  


 


 


Total Creatine Kinase 36  














 Date/Time


Source Procedure


Growth Status


 


 


 4/7/18 19:25


Stool Stool Cryptosporidium Exam - Final


NEGATIVE - NO CRYPTOSPORIDIUM ANTIGEN... Complete


 


 4/7/18 19:25


Stool Stool Giardia Antigen (GEO) - Final


NEGATIVE - NO GIARDIA ANTIGEN DETECTE... Complete








Radiology





Last Impressions








Chest X-Ray 4/6/18 1119 Signed





Impressions: 





 Service Date/Time:  Friday, April 6, 2018 11:28 - CONCLUSION:  No acute 





 cardiopulmonary process to explain current clinical symptoms.     Malachi Smith MD 


 


Abdomen/Pelvis CT 4/6/18 1119 Signed





Impressions: 





 Service Date/Time:  Friday, April 6, 2018 12:37 - CONCLUSION:  1. Acute on 





 chronic appendicitis suspected in the proper clinical setting; there is an 





 indurated appearing fluid-filled viscus that appears to be blind ending 





 posteromedial to the cecum. No free air seen. 2. Enlarged and severely fatty 





 infiltrated liver. 3. Right ovarian cyst.     Familia Mcgrath MD ADDENDUM:   





 Apparently patient has a normal white count and does not have other clinical 





 features typical for appendicitis. In looking the study over again, there is 





 potentially a portion of a normal appendix seen inferior to the cecum on 

series 





 601 image 68 which would also mitigate against appendicitis. The blind ending 





 pouch posteromedial to the cecum is best nonspecific but I believe abnormal, 





 could be a Meckel's diverticulum. There definitely seems to be some 

inflammatory 





 change of it and the adjacent distal ileum, series 601 image 96. Crohn's 

disease 





 with an adjacent abscess would also be conceivable but unlikely in the setting 





 of a normal white count.   Familia Mcgrath MD 








Narrative Exam


NAD


Abd: nontender, large right subcostal scar





A/P


Assessment and Plan


52 yo F with ? intraabdominal abscess, bloody diarrhea, increased LFTs with 

hepatomegaly.  





Colonoscopy showed cecal erythema. 





MRCP shows hepatomegaly with hepatic steatosis.  Stricturing of one branch of 

right hepatic duct.  She relates h/o multiple pancreatic debridements in late 

80s with open abdomen in hospital 6 weeks.





Repeat CT a/p is unremarkable aside from the hepatomegaly.  No evidence of 

intraabdominal abscess. No surgical intervention required.  I will sign off and 

be available as needed.











Bradley Strauss MD Apr 11, 2018 10:22

## 2018-04-11 NOTE — HHI.PR
Subjective


Remarks


53 YOWF with COPD, SOB,Diverticulitis





Breathing better


On RA


Tolerates PO





Objective


Vital Signs





Vital Signs








  Date Time  Temp Pulse Resp B/P (MAP) Pulse Ox O2 Delivery O2 Flow Rate FiO2


 


4/11/18 16:00 97.1 80 17 112/60 (77) 93   


 


4/11/18 12:00 97.0 95 17 116/58 (77) 92   


 


4/11/18 08:00 97.1 104 17 115/92 (100) 95   


 


4/11/18 04:16 97.7 87 18 97/53 (68) 96   


 


4/10/18 23:10   20     














I/O      


 


 4/10/18 4/10/18 4/10/18 4/11/18 4/11/18 4/11/18





 07:00 15:00 23:00 07:00 15:00 23:00


 


Intake Total 480 ml 1150 ml 840 ml 780 ml 300 ml 1756 ml


 


Balance 480 ml 1150 ml 840 ml 780 ml 300 ml 1756 ml


 


      


 


Intake Oral 480 ml  840 ml 780 ml  960 ml


 


IV Total  1150 ml   300 ml 796 ml


 


# Voids 3  9 3  8


 


# Bowel Movements      6








Result Diagram:  


4/11/18 0900                                                                   

             4/11/18 0900





Objective Remarks


GENERAL: MBMN WF,NAD


SKIN: Warm and dry.


HEAD: Normocephalic.


EYES: No scleral icterus. No injection or drainage. 


NECK: Supple, trachea midline. No JVD or lymphadenopathy.


CARDIOVASCULAR: Regular rate and rhythm without murmurs, gallops, or rubs. 


RESPIRATORY: Breath sounds equal bilaterally. No accessory muscle use.


GASTROINTESTINAL: Abdomen soft, non-tender, nondistended. 


MUSCULOSKELETAL: No cyanosis, or edema. 


BACK: Nontender without obvious deformity. No CVA tenderness.








A/P


Assessment and Plan


IMPRESSION:


1.  Shortness of Breath and Hypoxia, improving


2.  Chronic obstructive pulmonary disease exacerbation.


3.  Diverticulitis.


4.  Anxiety and depression.


 





PLAN:





Aerosol nebs


Cont Abx.


Stable from pulm standpoint


Stable on RA











Sonny Nagy MD Apr 11, 2018 20:49

## 2018-04-11 NOTE — HHI.PR
Subjective


Remarks


Diet started last night, 


still having loose stools.





Objective





Vital Signs








  Date Time  Temp Pulse Resp B/P (MAP) Pulse Ox O2 Delivery O2 Flow Rate FiO2


 


4/11/18 04:16 97.7 87 18 97/53 (68) 96   


 


4/10/18 23:10   20     


 


4/10/18 20:45     98   21


 


4/10/18 20:00 97.6 67 18 115/64 (81) 96   


 


4/10/18 16:00 97.4 72 19 146/63 (90) 94   


 


4/10/18 12:00 97.8 76 19 129/66 (87) 94   


 


4/10/18 08:00 97.7 76 19 114/59 (77) 96   














I/O      


 


 4/10/18 4/10/18 4/10/18 4/11/18 4/11/18 4/11/18





 07:00 15:00 23:00 07:00 15:00 23:00


 


Intake Total 480 ml 1150 ml 840 ml 780 ml  


 


Balance 480 ml 1150 ml 840 ml 780 ml  


 


      


 


Intake Oral 480 ml  840 ml 780 ml  


 


IV Total  1150 ml    


 


# Voids 3  9 3  








Result Diagram:  


4/7/18 0547                                                                    

            4/10/18 1242





Imaging





Last 72 hours Impressions








Abdomen/Pelvis CT 4/10/18 0000 Signed





Impressions: 





 Service Date/Time:  Tuesday, April 10, 2018 17:30 - CONCLUSION:  1. Markedly 





 enlarged fatty liver.  2. Free fluid within the cul-de-sac. 3. Ventral 

abdominal 





 wall hernia containing only fat.  4. 2.7 cm right ovarian cyst.  5. Tiny 





 bilateral pleural effusions. 6. Scoliosis and degenerative changes of the 





 thoraco-lumbar spine.     Abhi Matthews MD 








Objective Remarks


GENERAL: Alert, Oriented NAD. 


SKIN: Warm and dry.


HEAD: Normocephalic.


EYES: No scleral icterus. No injection or drainage. 


NECK: Supple, trachea midline. No JVD or lymphadenopathy.


CARDIOVASCULAR: Regular rate and rhythm without murmurs, gallops, or rubs. 


RESPIRATORY: Breath sounds equal bilaterally. No accessory muscle use.


GASTROINTESTINAL: Abdomen soft, nondistended  


MUSCULOSKELETAL: No cyanosis, or edema. 


BACK: Nontender without obvious deformity. No CVA tenderness.


Medications and IVs





Current Medications








 Medications


  (Trade)  Dose


 Ordered  Sig/Sahil


 Route  Start Time


 Stop Time Status Last Admin


 


  (NS Flush)  2 ml  UNSCH  PRN


 IV FLUSH  4/6/18 11:30


    4/6/18 18:49


 


 


 Sodium Chloride  1,000 ml @ 


 100 mls/hr  Q10H


 IV  4/6/18 14:33


    4/6/18 15:44


 


 


  (NS Flush)  2 ml  UNSCH  PRN


 IV FLUSH  4/6/18 14:45


     


 


 


  (NS Flush)  2 ml  BID


 IV FLUSH  4/6/18 21:00


    4/10/18 21:00


 


 


  (Zofran Inj)  4 mg  Q6H  PRN


 IVP  4/6/18 14:45


    4/10/18 16:57


 


 


  (Narcan Inj)  0.4 mg  UNSCH  PRN


 IV PUSH  4/6/18 14:45


     


 


 


 Ciprofloxacin/


 Dextrose  200 ml @ 


 200 mls/hr  Q12H


 IV  4/7/18 01:00


    4/11/18 02:03


 


 


 Metronidazole  100 ml @ 


 100 mls/hr  Q8HR


 IV  4/6/18 22:00


    4/11/18 04:31


 


 


  (Dilaudid Pf Inj)  1 mg  Q4H  PRN


 IV PUSH  4/6/18 14:45


    4/10/18 22:15


 


 


  (Duoneb Neb)  1 ampule  Q2HR NEB  PRN


 NEB  4/6/18 15:15


    4/10/18 20:43


 


 


  (SoluMEDROL INJ)  40 mg  Q12HR


 IV PUSH  4/6/18 21:00


    4/10/18 21:13


 


 


  (Protonix Inj)  40 mg  Q12H


 IV PUSH  4/6/18 17:00


    4/11/18 04:31


 


 


 Potassium


 Chloride/Dextrose/


 Sod Cl  1,000 ml @ 


 100 mls/hr  Q10H


 IV  4/6/18 20:45


    4/11/18 02:03


 


 


  (Lexapro)  10 mg  DAILY


 PO  4/7/18 09:00


    4/10/18 09:17


 


 


  (Ambien)  10 mg  HS  PRN


 PO  4/6/18 22:45


    4/10/18 22:15


 


 


  (Tylenol)  650 mg  Q6H  PRN


 PO  4/7/18 19:30


    4/7/18 20:05


 


 


  (Questran 4 Gm


 Pkt)  4 gm  Q8HR


 PO  4/8/18 14:00


    4/11/18 04:31


 


 


 Lactated Ringer's  1,000 ml @ 


 30 mls/hr  Q24H PRN


 IV  4/9/18 04:45


 4/12/18 04:44   


 


 


  (Romazicon Inj)  0.2 mg  Q1M  PRN


 IV PUSH  4/9/18 09:15


     


 


 


  (Ativan)  1 mg  Q4H  PRN


 PO  4/9/18 09:15


     


 


 


  (Ativan Inj)  1 mg  Q4H  PRN


 IV PUSH  4/9/18 09:15


     


 


 


  (Ativan)  2 mg  Q2H  PRN


 PO  4/9/18 09:15


     


 


 


  (Ativan Inj)  2 mg  Q2H  PRN


 IV PUSH  4/9/18 09:15


     


 


 


  (Ativan Inj)  2 mg  Q1H  PRN


 IV PUSH  4/9/18 09:15


     


 


 


  (Ativan Inj)  2 mg  Q15M  PRN


 IV PUSH  4/9/18 09:15


     


 


 


  (Creon 24-)  1 cap  TID


 PO  4/10/18 18:00


    4/10/18 17:55


 











Assessment and Plan


Problem List:  


(1) Diarrhea


ICD Codes:  R19.7 - Diarrhea, unspecified


Status:  Acute


Plan:  Cont to GI recs. ERCP not completed


severe hepatics steatosis, questionable narrowing  DELATORRE pending.


Questran


Stool negative for Cdiff, ova and parasites. 





(2) Ulcerative colitis


ICD Codes:  K51.90 - Ulcerative colitis, unspecified, without complications


Status:  Acute


Plan:  Follow GI recs, Cipro, Flagyl.





(3) COPD with acute exacerbation


ICD Codes:  J44.1 - Chronic obstructive pulmonary disease with (acute) 

exacerbation


Status:  Acute


Plan:  Stable on R/A


Pulmonary following





Discharge Planning


Home when cleared by GI





Problem Qualifiers





(1) Ulcerative colitis:  


Qualified Codes:  K51.919 - Ulcerative colitis, unspecified with unspecified 

complications








Radha Gabriel Apr 11, 2018 06:53

## 2018-04-12 VITALS
SYSTOLIC BLOOD PRESSURE: 120 MMHG | TEMPERATURE: 97.9 F | HEART RATE: 82 BPM | OXYGEN SATURATION: 97 % | DIASTOLIC BLOOD PRESSURE: 62 MMHG | RESPIRATION RATE: 16 BRPM

## 2018-04-12 VITALS
SYSTOLIC BLOOD PRESSURE: 129 MMHG | TEMPERATURE: 97.8 F | DIASTOLIC BLOOD PRESSURE: 81 MMHG | RESPIRATION RATE: 20 BRPM | HEART RATE: 92 BPM | OXYGEN SATURATION: 95 %

## 2018-04-12 VITALS
DIASTOLIC BLOOD PRESSURE: 81 MMHG | HEART RATE: 99 BPM | OXYGEN SATURATION: 96 % | SYSTOLIC BLOOD PRESSURE: 128 MMHG | TEMPERATURE: 97.6 F | RESPIRATION RATE: 20 BRPM

## 2018-04-12 VITALS
OXYGEN SATURATION: 96 % | DIASTOLIC BLOOD PRESSURE: 67 MMHG | HEART RATE: 90 BPM | SYSTOLIC BLOOD PRESSURE: 121 MMHG | TEMPERATURE: 97.8 F | RESPIRATION RATE: 18 BRPM

## 2018-04-12 VITALS
OXYGEN SATURATION: 96 % | TEMPERATURE: 98 F | DIASTOLIC BLOOD PRESSURE: 65 MMHG | HEART RATE: 79 BPM | SYSTOLIC BLOOD PRESSURE: 124 MMHG | RESPIRATION RATE: 18 BRPM

## 2018-04-12 LAB — CERULOPLASMIN SERPL-MCNC: 21 MG/DL (ref 18–53)

## 2018-04-12 RX ADMIN — SODIUM CHLORIDE SCH MLS/HR: 900 INJECTION, SOLUTION INTRAVENOUS at 14:10

## 2018-04-12 RX ADMIN — CHOLESTYRAMINE SCH GM: 4 POWDER, FOR SUSPENSION ORAL at 21:28

## 2018-04-12 RX ADMIN — PANCRELIPASE SCH CAP: 24000; 76000; 120000 CAPSULE, DELAYED RELEASE PELLETS ORAL at 09:33

## 2018-04-12 RX ADMIN — PANCRELIPASE SCH CAP: 24000; 76000; 120000 CAPSULE, DELAYED RELEASE PELLETS ORAL at 16:57

## 2018-04-12 RX ADMIN — PANTOPRAZOLE SODIUM SCH MG: 40 INJECTION, POWDER, FOR SOLUTION INTRAVENOUS at 04:51

## 2018-04-12 RX ADMIN — SODIUM CHLORIDE SCH MLS/HR: 900 INJECTION, SOLUTION INTRAVENOUS at 04:51

## 2018-04-12 RX ADMIN — POTASSIUM CHLORIDE, DEXTROSE MONOHYDRATE AND SODIUM CHLORIDE SCH MLS/HR: 150; 5; 450 INJECTION, SOLUTION INTRAVENOUS at 04:53

## 2018-04-12 RX ADMIN — HYDROMORPHONE HYDROCHLORIDE PRN MG: 2 INJECTION INTRAMUSCULAR; INTRAVENOUS; SUBCUTANEOUS at 16:58

## 2018-04-12 RX ADMIN — POTASSIUM CHLORIDE, DEXTROSE MONOHYDRATE AND SODIUM CHLORIDE SCH MLS/HR: 150; 5; 450 INJECTION, SOLUTION INTRAVENOUS at 16:56

## 2018-04-12 RX ADMIN — CIPROFLOXACIN SCH MLS/HR: 2 INJECTION, SOLUTION INTRAVENOUS at 12:26

## 2018-04-12 RX ADMIN — ESCITALOPRAM OXALATE SCH MG: 10 TABLET, FILM COATED ORAL at 09:33

## 2018-04-12 RX ADMIN — CIPROFLOXACIN SCH MLS/HR: 2 INJECTION, SOLUTION INTRAVENOUS at 01:45

## 2018-04-12 RX ADMIN — PHENYTOIN SODIUM SCH MLS/HR: 50 INJECTION INTRAMUSCULAR; INTRAVENOUS at 20:33

## 2018-04-12 RX ADMIN — PHENYTOIN SODIUM SCH MLS/HR: 50 INJECTION INTRAMUSCULAR; INTRAVENOUS at 00:33

## 2018-04-12 RX ADMIN — ONDANSETRON PRN MG: 2 INJECTION, SOLUTION INTRAMUSCULAR; INTRAVENOUS at 12:29

## 2018-04-12 RX ADMIN — PANTOPRAZOLE SCH MG: 40 TABLET, DELAYED RELEASE ORAL at 16:57

## 2018-04-12 RX ADMIN — METHYLPREDNISOLONE SODIUM SUCCINATE SCH MG: 40 INJECTION, POWDER, FOR SOLUTION INTRAMUSCULAR; INTRAVENOUS at 21:26

## 2018-04-12 RX ADMIN — CHOLESTYRAMINE SCH GM: 4 POWDER, FOR SUSPENSION ORAL at 04:51

## 2018-04-12 RX ADMIN — CHOLESTYRAMINE SCH GM: 4 POWDER, FOR SUSPENSION ORAL at 14:11

## 2018-04-12 RX ADMIN — Medication SCH ML: at 21:27

## 2018-04-12 RX ADMIN — ZOLPIDEM TARTRATE PRN MG: 10 TABLET, FILM COATED ORAL at 21:43

## 2018-04-12 RX ADMIN — SODIUM CHLORIDE SCH MLS/HR: 900 INJECTION, SOLUTION INTRAVENOUS at 21:27

## 2018-04-12 RX ADMIN — METHYLPREDNISOLONE SODIUM SUCCINATE SCH MG: 40 INJECTION, POWDER, FOR SOLUTION INTRAMUSCULAR; INTRAVENOUS at 09:34

## 2018-04-12 RX ADMIN — POTASSIUM CHLORIDE SCH MEQ: 750 CAPSULE, EXTENDED RELEASE ORAL at 09:33

## 2018-04-12 RX ADMIN — HYDROMORPHONE HYDROCHLORIDE PRN MG: 2 INJECTION INTRAMUSCULAR; INTRAVENOUS; SUBCUTANEOUS at 12:29

## 2018-04-12 RX ADMIN — PANCRELIPASE SCH CAP: 24000; 76000; 120000 CAPSULE, DELAYED RELEASE PELLETS ORAL at 12:26

## 2018-04-12 RX ADMIN — PHENYTOIN SODIUM SCH MLS/HR: 50 INJECTION INTRAMUSCULAR; INTRAVENOUS at 10:33

## 2018-04-12 RX ADMIN — HYDROMORPHONE HYDROCHLORIDE PRN MG: 2 INJECTION INTRAMUSCULAR; INTRAVENOUS; SUBCUTANEOUS at 21:29

## 2018-04-12 RX ADMIN — Medication SCH ML: at 09:00

## 2018-04-12 NOTE — HHI.PR
Subjective


Remarks


 still is having loose stools, tolerating diet





Objective





Vital Signs








  Date Time  Temp Pulse Resp B/P (MAP) Pulse Ox O2 Delivery O2 Flow Rate FiO2


 


4/12/18 00:00 97.9 82 16 120/62 (81) 97   


 


4/11/18 22:00   20     


 


4/11/18 20:00 97.7 84 16 123/60 (81) 97   


 


4/11/18 16:00 97.1 80 17 112/60 (77) 93   


 


4/11/18 12:00 97.0 95 17 116/58 (77) 92   


 


4/11/18 08:00 97.1 104 17 115/92 (100) 95   














I/O      


 


 4/11/18 4/11/18 4/11/18 4/12/18 4/12/18 4/12/18





 07:00 15:00 23:00 07:00 15:00 23:00


 


Intake Total 780 ml 300 ml 1756 ml 240 ml  


 


Balance 780 ml 300 ml 1756 ml 240 ml  


 


      


 


Intake Oral 780 ml  960 ml 240 ml  


 


IV Total  300 ml 796 ml   


 


# Voids 3  8 3  


 


# Bowel Movements   6 1  








Result Diagram:  


4/11/18 0900                                                                   

             4/11/18 0900





Imaging





Last 72 hours Impressions








Abdomen/Pelvis CT 4/10/18 0000 Signed





Impressions: 





 Service Date/Time:  Tuesday, April 10, 2018 17:30 - CONCLUSION:  1. Markedly 





 enlarged fatty liver.  2. Free fluid within the cul-de-sac. 3. Ventral 

abdominal 





 wall hernia containing only fat.  4. 2.7 cm right ovarian cyst.  5. Tiny 





 bilateral pleural effusions. 6. Scoliosis and degenerative changes of the 





 thoraco-lumbar spine.     Abhi Matthews MD 








Objective Remarks


GENERAL: Alert, Oriented NAD. 


SKIN: Warm and dry.


HEAD: Normocephalic.


EYES: No scleral icterus. No injection or drainage. 


NECK: Supple, trachea midline. No JVD or lymphadenopathy.


CARDIOVASCULAR: Regular rate and rhythm without murmurs, gallops, or rubs. 


RESPIRATORY: Breath sounds equal bilaterally. No accessory muscle use.


GASTROINTESTINAL: Abdomen soft, nondistended  


MUSCULOSKELETAL: No cyanosis, or edema. 


BACK: Nontender without obvious deformity. No CVA tenderness.


Medications and IVs





Current Medications








 Medications


  (Trade)  Dose


 Ordered  Sig/Sahil


 Route  Start Time


 Stop Time Status Last Admin


 


  (NS Flush)  2 ml  UNSCH  PRN


 IV FLUSH  4/6/18 11:30


    4/6/18 18:49


 


 


 Sodium Chloride  1,000 ml @ 


 100 mls/hr  Q10H


 IV  4/6/18 14:33


    4/6/18 15:44


 


 


  (NS Flush)  2 ml  UNSCH  PRN


 IV FLUSH  4/6/18 14:45


     


 


 


  (NS Flush)  2 ml  BID


 IV FLUSH  4/6/18 21:00


    4/11/18 20:46


 


 


  (Zofran Inj)  4 mg  Q6H  PRN


 IVP  4/6/18 14:45


    4/11/18 15:53


 


 


  (Narcan Inj)  0.4 mg  UNSCH  PRN


 IV PUSH  4/6/18 14:45


     


 


 


 Ciprofloxacin/


 Dextrose  200 ml @ 


 200 mls/hr  Q12H


 IV  4/7/18 01:00


    4/12/18 01:45


 


 


 Metronidazole  100 ml @ 


 100 mls/hr  Q8HR


 IV  4/6/18 22:00


    4/12/18 04:51


 


 


  (Dilaudid Pf Inj)  1 mg  Q4H  PRN


 IV PUSH  4/6/18 14:45


    4/11/18 20:41


 


 


  (Duoneb Neb)  1 ampule  Q2HR NEB  PRN


 NEB  4/6/18 15:15


    4/10/18 20:43


 


 


  (SoluMEDROL INJ)  40 mg  Q12HR


 IV PUSH  4/6/18 21:00


    4/11/18 20:41


 


 


  (Protonix Inj)  40 mg  Q12H


 IV PUSH  4/6/18 17:00


    4/12/18 04:51


 


 


 Potassium


 Chloride/Dextrose/


 Sod Cl  1,000 ml @ 


 100 mls/hr  Q10H


 IV  4/6/18 20:45


    4/12/18 04:53


 


 


  (Lexapro)  10 mg  DAILY


 PO  4/7/18 09:00


    4/11/18 08:40


 


 


  (Ambien)  10 mg  HS  PRN


 PO  4/6/18 22:45


    4/11/18 20:41


 


 


  (Tylenol)  650 mg  Q6H  PRN


 PO  4/7/18 19:30


    4/7/18 20:05


 


 


  (Questran 4 Gm


 Pkt)  4 gm  Q8HR


 PO  4/8/18 14:00


    4/11/18 13:03


 


 


  (Romazicon Inj)  0.2 mg  Q1M  PRN


 IV PUSH  4/9/18 09:15


     


 


 


  (Ativan)  1 mg  Q4H  PRN


 PO  4/9/18 09:15


     


 


 


  (Ativan Inj)  1 mg  Q4H  PRN


 IV PUSH  4/9/18 09:15


     


 


 


  (Ativan)  2 mg  Q2H  PRN


 PO  4/9/18 09:15


     


 


 


  (Ativan Inj)  2 mg  Q2H  PRN


 IV PUSH  4/9/18 09:15


     


 


 


  (Ativan Inj)  2 mg  Q1H  PRN


 IV PUSH  4/9/18 09:15


     


 


 


  (Ativan Inj)  2 mg  Q15M  PRN


 IV PUSH  4/9/18 09:15


     


 


 


  (Creon 24-)  1 cap  TID


 PO  4/10/18 18:00


    4/11/18 17:59


 











Assessment and Plan


Problem List:  


(1) Diarrhea


ICD Codes:  R19.7 - Diarrhea, unspecified


Status:  Acute


Plan:  Diarrhea likely r/t removal of gallbladder. 


Work up this far show no identifying reason for Diarrhea. 


Stool negative for Cdiff, ova and parasites. 


Colon Biopsy pending














(2) Ulcerative colitis


ICD Codes:  K51.90 - Ulcerative colitis, unspecified, without complications


Status:  Acute


Plan:  Follow GI recs, Cipro, Flagyl.





(3) COPD with acute exacerbation


ICD Codes:  J44.1 - Chronic obstructive pulmonary disease with (acute) 

exacerbation


Status:  Acute


Plan:  Stable on R/A


Pulmonary following





Discharge Planning


DC once cleared by GI





Problem Qualifiers





(1) Ulcerative colitis:  


Qualified Codes:  K51.919 - Ulcerative colitis, unspecified with unspecified 

complications








Radha Gabriel Apr 12, 2018 07:39

## 2018-04-12 NOTE — HHI.PR
Subjective


Remarks


53 YOWF with COPD, SOB,Diverticulitis





Breathing better


On RA


Tolerates PO


Plans not to smoke





Objective


Vital Signs





Vital Signs








  Date Time  Temp Pulse Resp B/P (MAP) Pulse Ox O2 Delivery O2 Flow Rate FiO2


 


4/12/18 16:00 97.8 92 20 129/81 (97) 95   


 


4/12/18 12:00 97.6 99 20 128/81 (97) 96   


 


4/12/18 09:49 97.8 90 18 121/67 (85) 96   


 


4/12/18 00:00 97.9 82 16 120/62 (81) 97   


 


4/11/18 22:00   20     


 


4/11/18 20:00 97.7 84 16 123/60 (81) 97   














I/O      


 


 4/11/18 4/11/18 4/11/18 4/12/18 4/12/18 4/12/18





 07:00 15:00 23:00 07:00 15:00 23:00


 


Intake Total 780 ml 300 ml 1756 ml 240 ml 240 ml 750 ml


 


Output Total      800 ml


 


Balance 780 ml 300 ml 1756 ml 240 ml 240 ml -50 ml


 


      


 


Intake Oral 780 ml  960 ml 240 ml 240 ml 750 ml


 


IV Total  300 ml 796 ml   


 


Output Urine Total      800 ml


 


# Voids 3  8 3  


 


# Bowel Movements   6 1  3








Result Diagram:  


4/11/18 0900 4/11/18 0900





Objective Remarks


GENERAL: MBMN WF,NAD


SKIN: Warm and dry.


HEAD: Normocephalic.


EYES: No scleral icterus. No injection or drainage. 


NECK: Supple, trachea midline. No JVD or lymphadenopathy.


CARDIOVASCULAR: Regular rate and rhythm without murmurs, gallops, or rubs. 


RESPIRATORY: Breath sounds equal bilaterally. No accessory muscle use.


GASTROINTESTINAL: Abdomen soft, non-tender, nondistended. 


MUSCULOSKELETAL: No cyanosis, or edema. 


BACK: Nontender without obvious deformity. No CVA tenderness.








A/P


Assessment and Plan


IMPRESSION:


1.  Shortness of Breath and Hypoxia, improving


2.  Chronic obstructive pulmonary disease exacerbation.


3.  Diverticulitis.


4.  Anxiety and depression.


 





PLAN:





Aerosol nebs


Cont Abx.


Stable from pulm standpoint


Stable on RA


Check PFT











Sonny Nagy MD Apr 12, 2018 19:54

## 2018-04-12 NOTE — HHI.GIFU
Subjective


Remarks


Pt sitting up in bed eating lunch.  at bedside.  2 bm today.  Feels she 

has had some improvement since starting cholestyramine.


 (Shakira Servin)





Objective


Vitals I&O





Vital Signs








  Date Time  Temp Pulse Resp B/P (MAP) Pulse Ox O2 Delivery O2 Flow Rate FiO2


 


4/12/18 09:49 97.8 90 18 121/67 (85) 96   


 


4/12/18 00:00 97.9 82 16 120/62 (81) 97   


 


4/11/18 22:00   20     


 


4/11/18 20:00 97.7 84 16 123/60 (81) 97   


 


4/11/18 16:00 97.1 80 17 112/60 (77) 93   














I/O      


 


 4/11/18 4/11/18 4/11/18 4/12/18 4/12/18 4/12/18





 07:00 15:00 23:00 07:00 15:00 23:00


 


Intake Total 780 ml 300 ml 1756 ml 240 ml 240 ml 


 


Balance 780 ml 300 ml 1756 ml 240 ml 240 ml 


 


      


 


Intake Oral 780 ml  960 ml 240 ml 240 ml 


 


IV Total  300 ml 796 ml   


 


# Voids 3  8 3  


 


# Bowel Movements   6 1  








Laboratory











 Date/Time


Source Procedure


Growth Status


 


 


 4/11/18 11:37


Stool Stool - Final


NO ENTERIC PATHOGENS DETECTED BY PCR... Complete








Imaging





Last Impressions








Abdomen/Pelvis CT 4/10/18 0000 Signed





Impressions: 





 Service Date/Time:  Tuesday, April 10, 2018 17:30 - CONCLUSION:  1. Markedly 





 enlarged fatty liver.  2. Free fluid within the cul-de-sac. 3. Ventral 

abdominal 





 wall hernia containing only fat.  4. 2.7 cm right ovarian cyst.  5. Tiny 





 bilateral pleural effusions. 6. Scoliosis and degenerative changes of the 





 thoraco-lumbar spine.     Abhi Matthews MD 


 


Cholangiopancreatography MRI 4/7/18 0000 Signed





Impressions: 





 Service Date/Time:  Saturday, April 7, 2018 14:36 - CONCLUSION:  1. 

Hepatomegaly 





 with very prominent hepatic steatosis. 2. Solitary smooth mild stricturing at 





 the intrahepatic biliary duct draining the posterior segment right lobe of the 





 liver. The distal branches of the posterior segment right lobe of the liver 

and 





 other distal branches all are the same caliber suggesting a significant 

stenosis 





 is not present. 3. Mild intrahepatic biliary duct dilatation which may reflect 





 reservoir phenomenon following cholecystectomy.     Familia Robison MD 


 


Chest X-Ray 4/6/18 1119 Signed





Impressions: 





 Service Date/Time:  Friday, April 6, 2018 11:28 - CONCLUSION:  No acute 





 cardiopulmonary process to explain current clinical symptoms.     Malachi Smith MD 








Physical Exam


HEENT: Normocephalic; atraumatic 


CHEST:  CTA


CARDIAC:  RRR


ABDOMEN:  Soft, nontender, mildly distended, bowel sounds active 


EXTREMITIES: No edema.


SKIN:  Normal; no rash; no jaundice.


CNS:  No focal deficits; alert and oriented times three


 (Shakira Servin)





Assessment and Plan


Plan


Assessment


- Nausea/vomiting/diarrhea for few weeks-  Hx of  colitis, not certain what 

type. was diagnosed many yrs ago, 


  colonoscopy was done many yrs ago, hasn't been on tx for this, hasn't been 

following with GI, she presented 


  to the ED with few weeks hx of nausea, vomiting, bloody and mucusy diarrhea. 

She states she has lost 22 


  pounds since.  She has a history of open cholecystectomy and reports that she 

was told her appendix was 


  removed at that time.  


  CT abdomen and pelvis W IV contrast (4/6) --> At first radiology read it as 

acute on chronic appendicitis, an


  indurated appearing fluid-filled viscus that appears to be blind ending 

posteromedial to the cecum, no free air.


  Addendum noted that a portion of normal appendix was seen which could 

mitigate against appendicitis. The


  blind ending pouch posteromedial to the cecum is best nonspecific but 

believes to be abnormal, could be a 


  Meckel's diverticulum.  Seems to be some inflammatory change of it and the 

adjacent distal ileum. 


- LFTs are all elevated, bilirubin and alkaline phosphatase higher than 

transaminases. Obstructive pattern,  


  pt is s/p cholecystectomy. 


- History of pancreatitis with multiple pancreatic debridement- states 

pancreatitis was secondary to gallstones





Work up so far:


MRCP (4/7) --> Hepatomegaly with very prominent hepatic steatosis. Solitary 

smooth mild stricturing at the


  intrahepatic biliary duct draining the posterior segment right lobe of the 

liver. The distal branches of the 


  posterior segment right lobe of the liver and other distal branches are all 

the same caliber suggesting a 


  significant stenosis is not present. Mild intrahepatic biliary duct 

dilatation which may reflect reservoir


  phenomenon following cholecystectomy. 


EGD --> Normal upper endoscopy


Colonoscopy --> Normal terminal ileum. Minimal erythema in the cecum. Biopsy. 

Normal colonoscopy 


  otherwise. Biopsy was done from sigmoid for diarrhea. No clear reason for 

weight loss and other GI 


  symptoms. 


Stool negative for C. Diff and ova and parasites.


Liver Work up:


  Hepatitis panel negative. GIA, ASMA negative. Ferritin-1760 Celiac panel WNL. 

[id number]


  AMA, ceruloplasmin pending





(4/10) Pt tolerating heart healthy diet. Denies nausea, vomiting. Continued 

multiple episodes of diarrhea


  throughout the day, denies hematochezia and melena. Associated abdominal 

cramping with BMs.


  LFTs remain about the same today.  Liver work up pending as above.  


  GS following- ordered repeat CT abd/pelvis. Pt remains on Cipro and Flagyl. 





(4/11) Pt reports that her diarrhea seems to be improving some.  Slept well 

last night, just woke up


  prior to my exam so has not eaten yet today. She is planning on having some 

soup. 


  Enteric pathogens stool pending. IGG4, colon biopsies pending. 


  No repeat LFTs from today, will add on now. 


  Repeat CT abdomen and pelvis did not show any intraabdominal abscess so GS 

has signed off.





4/12/18  some improvement with cholestyramine.  2 bm today.  tolerating diet.  

no abd pain, n/v.  liver w/u so far negative.  stool studies all negative.  


   BX neg for colitis.  IGG4 pending.





Plan:


- continue Cholestyramine 


- Trial pancreatic enzymes


- GILMA


- PRN immodium


- f/u with GI as outpt in 1 week


- cleared for d/c from GI standpoint





Pt has been seen and examined by myself and Dr. Grimm and this note is 

written on his behalf 





 


 (Shakira Servin)


Plan


Patient was seen and examined, agree with above-noted, biopsy was negative, 

patient can go home and follow-up as an outpatient, most likely either 

irritable bowel syndrome or diarrhea postcholecystectomy


 (Staci Grimm MD)











Shakira Servin Apr 12, 2018 13:01


Staci Grimm MD Apr 12, 2018 16:45

## 2018-04-13 VITALS
DIASTOLIC BLOOD PRESSURE: 76 MMHG | OXYGEN SATURATION: 96 % | RESPIRATION RATE: 18 BRPM | HEART RATE: 75 BPM | TEMPERATURE: 98.1 F | SYSTOLIC BLOOD PRESSURE: 124 MMHG

## 2018-04-13 VITALS
SYSTOLIC BLOOD PRESSURE: 105 MMHG | RESPIRATION RATE: 18 BRPM | DIASTOLIC BLOOD PRESSURE: 63 MMHG | TEMPERATURE: 97.9 F | OXYGEN SATURATION: 93 % | HEART RATE: 72 BPM

## 2018-04-13 RX ADMIN — ONDANSETRON PRN MG: 2 INJECTION, SOLUTION INTRAMUSCULAR; INTRAVENOUS at 10:41

## 2018-04-13 RX ADMIN — POTASSIUM CHLORIDE SCH MEQ: 750 CAPSULE, EXTENDED RELEASE ORAL at 09:07

## 2018-04-13 RX ADMIN — HYDROMORPHONE HYDROCHLORIDE PRN MG: 2 INJECTION INTRAMUSCULAR; INTRAVENOUS; SUBCUTANEOUS at 09:13

## 2018-04-13 RX ADMIN — ONDANSETRON PRN MG: 2 INJECTION, SOLUTION INTRAMUSCULAR; INTRAVENOUS at 04:17

## 2018-04-13 RX ADMIN — ESCITALOPRAM OXALATE SCH MG: 10 TABLET, FILM COATED ORAL at 09:07

## 2018-04-13 RX ADMIN — CHOLESTYRAMINE SCH GM: 4 POWDER, FOR SUSPENSION ORAL at 04:21

## 2018-04-13 RX ADMIN — SODIUM CHLORIDE SCH MLS/HR: 900 INJECTION, SOLUTION INTRAVENOUS at 04:20

## 2018-04-13 RX ADMIN — PHENYTOIN SODIUM SCH MLS/HR: 50 INJECTION INTRAMUSCULAR; INTRAVENOUS at 04:22

## 2018-04-13 RX ADMIN — METHYLPREDNISOLONE SODIUM SUCCINATE SCH MG: 40 INJECTION, POWDER, FOR SOLUTION INTRAMUSCULAR; INTRAVENOUS at 09:06

## 2018-04-13 RX ADMIN — Medication SCH ML: at 09:00

## 2018-04-13 RX ADMIN — HYDROMORPHONE HYDROCHLORIDE PRN MG: 2 INJECTION INTRAMUSCULAR; INTRAVENOUS; SUBCUTANEOUS at 04:23

## 2018-04-13 RX ADMIN — CIPROFLOXACIN SCH MLS/HR: 2 INJECTION, SOLUTION INTRAVENOUS at 00:28

## 2018-04-13 RX ADMIN — PANCRELIPASE SCH CAP: 24000; 76000; 120000 CAPSULE, DELAYED RELEASE PELLETS ORAL at 09:06

## 2018-04-13 RX ADMIN — POTASSIUM CHLORIDE, DEXTROSE MONOHYDRATE AND SODIUM CHLORIDE SCH MLS/HR: 150; 5; 450 INJECTION, SOLUTION INTRAVENOUS at 00:29

## 2018-04-13 RX ADMIN — PANTOPRAZOLE SCH MG: 40 TABLET, DELAYED RELEASE ORAL at 04:20

## 2018-04-13 NOTE — HHI.DS
Discharge Summary


Admission Date


Apr 6, 2018 at 13:51


Admitting Diagnosis





Ulcerative colitis exacerbation, rule out appendicitis, elevated srinivas





(1) Abdominal pain


ICD Codes:  R10.9 - Unspecified abdominal pain


Status:  Acute


(2) Ulcerative colitis


ICD Codes:  K51.90 - Ulcerative colitis, unspecified, without complications


Status:  Acute


(3) COPD with acute exacerbation


ICD Codes:  J44.1 - Chronic obstructive pulmonary disease with (acute) 

exacerbation


Status:  Acute


CBC/BMP:  


4/11/18 0900                                                                   

             4/11/18 0900





Significant Findings





Laboratory Tests








Test


  4/10/18


12:42 4/10/18


14:39 4/11/18


09:00


 


Blood Urea Nitrogen 2 MG/DL (7-18)   2 MG/DL (7-18) 


 


Creatinine


  0.39 MG/DL


(0.50-1.00) 


  0.35 MG/DL


(0.50-1.00)


 


Random Glucose


  126 MG/DL


() 


  


 


 


Calcium Level


  7.8 MG/DL


(8.5-10.1) 


  7.9 MG/DL


(8.5-10.1)


 


Red Blood Count


  


  


  3.22 MIL/MM3


(4.00-5.30)


 


Hematocrit


  


  


  33.8 %


(35.0-46.0)


 


Mean Corpuscular Volume


  


  


  105.0 FL


(80.0-100.0)


 


Mean Corpuscular Hemoglobin


  


  


  35.9 PG


(27.0-34.0)


 


Neutrophils (%) (Auto)


  


  


  73.3 %


(16.0-70.0)


 


Potassium Level


  


  


  3.2 MEQ/L


(3.5-5.1)


 


Direct Bilirubin


  


  


  0.7 MG/DL


(0.0-0.2)


 


Aspartate Amino Transf


(AST/SGOT) 


  


  67 U/L (15-37) 


 


 


Alkaline Phosphatase


  


  


  293 U/L


()


 


Total Protein


  


  


  4.6 GM/DL


(6.4-8.2)


 


Albumin


  


  


  2.1 GM/DL


(3.4-5.0)








PE at Discharge


GENERAL: Alert, Oriented NAD. 


SKIN: Warm and dry.


HEAD: Normocephalic.


EYES: No scleral icterus. No injection or drainage. 


NECK: Supple, trachea midline. No JVD or lymphadenopathy.


CARDIOVASCULAR: Regular rate and rhythm without murmurs, gallops, or rubs. 


RESPIRATORY: Breath sounds equal bilaterally. No accessory muscle use.


GASTROINTESTINAL: Abdomen soft, nondistended  


MUSCULOSKELETAL: No cyanosis, or edema. 


BACK: Nontender without obvious deformity. No CVA tenderness.


Hospital Course


History of ulcerative colitis which has been quite stable for the last 2 years.

  She came to the emergency room for further evaluation was found to have 

tachycardia with abdominal tenderness and hypoxemia on exam.  Patient says her 

belly hurts when she takes a good breath but she also is congested on exam.  

She does have a history of continued tobacco use.  Indicates a fever at home 

was 101.  She has lost about 20 pounds over the last 3 weeks because she could 

not eat and because of persistent diarrhea.  Diarrhea is bloody with mucus.  

Here she has had a CT abdomen pelvis which is concerning for inflammatory 

changes in the appendix area.  Patient is also quite hypoxemic at 89% with a 

respiratory rate on my exam at 22.  She is recommended for further evaluation 

and treatment with IV antibiotics and bronchodilators due to acute respiratory 

failure and failed outpatient treatment of abdominal infection.  Abdominal pain 

is improved with IV hydromorphone





Pulmonary consulted for SOB w/ sats 89% PE ruled out. She was treated w/ O2, 

Solumedrol and bronchodilators, will f/u w/ PFT as outpatient. 





Abdomen CT abdomen pelvis which is concerning for inflammatory changes in the 

appendix area, Cipro and Flagyl started Surgery consulted. Patient thought to 

have an abscess associated with Crohn's, Meckel's diverticulitis, or possibly a 

diverticulum of the cecum or small bowel which is not inflamed surgery not 

indicated Iv antibiotics wakened


Repeat CT abdomen and pelvis on 4/10 did not show any intraabdominal abscess so 

GS has signed off.





MRCP (4/7) --> Hepatomegaly with very prominent hepatic steatosis. Solitary 

smooth mild stricturing at the


  intrahepatic biliary duct draining the posterior segment right lobe of the 

liver. The distal branches of the 


  posterior segment right lobe of the liver and other distal branches are all 

the same caliber suggesting a 


  significant stenosis is not present. Mild intrahepatic biliary duct 

dilatation which may reflect reservoir


  phenomenon following cholecystectomy. 


EGD --> Normal upper endoscopy


Colonoscopy --> Normal terminal ileum. Minimal erythema in the cecum. Biopsy. 

Normal colonoscopy 


  otherwise. Biopsy was done from sigmoid for diarrhea. No clear reason for 

weight loss and other GI 


  symptoms. 


Stool negative for C. Diff and ova and parasites.


Liver Work up:


  Hepatitis panel negative. GIA, ASMA negative. Ferritin-1760 Celiac panel WNL. 

[id number]


  AMA, ceruloplasmin pending





She will be DC to FU with Gi, pulmonary and PCP


Pt Condition on Discharge:  Good


Discharge Disposition:  Discharge Home


Discharge Instructions


DIET: Follow Instructions for:  As Tolerated, No Restrictions


Activities you can perform:  Regular-No Restrictions


Follow up Referrals:  


Gastroenterology - 1 Week with Staci Grimm MD


PCP Follow-up - 1 Week with Dr Greenberg [phone]


Pulmonology - 2 Weeks with Sonny Nagy MD





New Medications:  


Pancrelipase (Creon) 24,000-76,000-120,000 Units Cap


1 CAP PO TID for Diarrhea for 30 Days, CAP





Pantoprazole (Pantoprazole) 40 Mg Tab


40 MG PO DAILY for Nausea for 30 Days, #30 TAB





 


Continued Medications:  


Escitalopram (Escitalopram) 10 Mg Tab


10 MG PO DAILY, #30 TAB 0 Refills





Hyoscyamine ER 12 HR (Hyoscyamine ER 12 HR) 0.375 Ines


0.375 MG PO BID for Gastrointestinal disorders, TAB 0 Refills





Prednisone (Prednisone) 20 Mg Tab


20 MG PO AS DIRECTED for Inflammation, #11 TAB 0 Refills (This prescription has 

been renewed)


40 MG twice a day x 3 days, then 20 MG daily x 3 days, then 10 MG


 daily x 3 days


 


Discontinued Medications:  


Metronidazole (Flagyl) 500 Mg Tab


500 MG PO Q8HR for Infection, TAB 0 Refills





Zolpidem (Ambien) 10 Mg Tab


10 MG PO HS PRN for INSOMNIA, TAB 0 Refills

















Radha Gabriel Apr 13, 2018 11:07

## 2018-06-20 ENCOUNTER — HOSPITAL ENCOUNTER (OUTPATIENT)
Dept: HOSPITAL 17 - NEPD | Age: 53
Setting detail: OBSERVATION
LOS: 2 days | Discharge: HOME | End: 2018-06-22
Attending: FAMILY MEDICINE | Admitting: FAMILY MEDICINE
Payer: SELF-PAY

## 2018-06-20 VITALS
HEART RATE: 70 BPM | DIASTOLIC BLOOD PRESSURE: 61 MMHG | TEMPERATURE: 97.9 F | OXYGEN SATURATION: 92 % | SYSTOLIC BLOOD PRESSURE: 142 MMHG | RESPIRATION RATE: 17 BRPM

## 2018-06-20 VITALS
OXYGEN SATURATION: 94 % | HEART RATE: 90 BPM | SYSTOLIC BLOOD PRESSURE: 127 MMHG | RESPIRATION RATE: 19 BRPM | DIASTOLIC BLOOD PRESSURE: 60 MMHG

## 2018-06-20 VITALS
RESPIRATION RATE: 16 BRPM | DIASTOLIC BLOOD PRESSURE: 54 MMHG | OXYGEN SATURATION: 97 % | SYSTOLIC BLOOD PRESSURE: 114 MMHG | HEART RATE: 104 BPM | TEMPERATURE: 98.7 F

## 2018-06-20 VITALS — WEIGHT: 116.84 LBS | BODY MASS INDEX: 24.53 KG/M2 | HEIGHT: 58 IN

## 2018-06-20 VITALS
SYSTOLIC BLOOD PRESSURE: 127 MMHG | TEMPERATURE: 98.4 F | RESPIRATION RATE: 20 BRPM | HEART RATE: 88 BPM | DIASTOLIC BLOOD PRESSURE: 74 MMHG | OXYGEN SATURATION: 95 %

## 2018-06-20 DIAGNOSIS — R20.0: ICD-10-CM

## 2018-06-20 DIAGNOSIS — Z79.899: ICD-10-CM

## 2018-06-20 DIAGNOSIS — J44.1: ICD-10-CM

## 2018-06-20 DIAGNOSIS — K76.0: ICD-10-CM

## 2018-06-20 DIAGNOSIS — R79.89: ICD-10-CM

## 2018-06-20 DIAGNOSIS — K52.9: Primary | ICD-10-CM

## 2018-06-20 DIAGNOSIS — R51: ICD-10-CM

## 2018-06-20 DIAGNOSIS — I25.10: ICD-10-CM

## 2018-06-20 DIAGNOSIS — E78.5: ICD-10-CM

## 2018-06-20 DIAGNOSIS — G62.9: ICD-10-CM

## 2018-06-20 DIAGNOSIS — F32.9: ICD-10-CM

## 2018-06-20 DIAGNOSIS — F17.200: ICD-10-CM

## 2018-06-20 DIAGNOSIS — R42: ICD-10-CM

## 2018-06-20 DIAGNOSIS — N83.201: ICD-10-CM

## 2018-06-20 LAB
ALBUMIN SERPL-MCNC: 3.1 GM/DL (ref 3.4–5)
ALP SERPL-CCNC: 244 U/L (ref 45–117)
ALT SERPL-CCNC: 139 U/L (ref 10–53)
AST SERPL-CCNC: 154 U/L (ref 15–37)
BASOPHILS # BLD AUTO: 0 TH/MM3 (ref 0–0.2)
BASOPHILS NFR BLD: 0.2 % (ref 0–2)
BILIRUB SERPL-MCNC: 0.9 MG/DL (ref 0.2–1)
BUN SERPL-MCNC: 5 MG/DL (ref 7–18)
CALCIUM SERPL-MCNC: 7.7 MG/DL (ref 8.5–10.1)
CHLORIDE SERPL-SCNC: 105 MEQ/L (ref 98–107)
COLOR UR: YELLOW
CREAT SERPL-MCNC: 0.29 MG/DL (ref 0.5–1)
EOSINOPHIL # BLD: 0 TH/MM3 (ref 0–0.4)
EOSINOPHIL NFR BLD: 0 % (ref 0–4)
ERYTHROCYTE [DISTWIDTH] IN BLOOD BY AUTOMATED COUNT: 14.3 % (ref 11.6–17.2)
GFR SERPLBLD BASED ON 1.73 SQ M-ARVRAT: 242 ML/MIN (ref 89–?)
GLUCOSE SERPL-MCNC: 125 MG/DL (ref 74–106)
GLUCOSE UR STRIP-MCNC: (no result) MG/DL
HCO3 BLD-SCNC: 27.8 MEQ/L (ref 21–32)
HCT VFR BLD CALC: 43.9 % (ref 35–46)
HGB BLD-MCNC: 14.9 GM/DL (ref 11.6–15.3)
HGB UR QL STRIP: (no result)
INR PPP: 1 RATIO
KETONES UR STRIP-MCNC: 20 MG/DL
LYMPHOCYTES # BLD AUTO: 0.3 TH/MM3 (ref 1–4.8)
LYMPHOCYTES NFR BLD AUTO: 13.1 % (ref 9–44)
MAGNESIUM SERPL-MCNC: 1.8 MG/DL (ref 1.5–2.5)
MCH RBC QN AUTO: 33.7 PG (ref 27–34)
MCHC RBC AUTO-ENTMCNC: 33.9 % (ref 32–36)
MCV RBC AUTO: 99.4 FL (ref 80–100)
MONOCYTE #: 0.1 TH/MM3 (ref 0–0.9)
MONOCYTES NFR BLD: 5.3 % (ref 0–8)
NEUTROPHILS # BLD AUTO: 2 TH/MM3 (ref 1.8–7.7)
NEUTROPHILS NFR BLD AUTO: 81.4 % (ref 16–70)
NITRITE UR QL STRIP: (no result)
PLATELET # BLD: 111 TH/MM3 (ref 150–450)
PMV BLD AUTO: 8.6 FL (ref 7–11)
PROT SERPL-MCNC: 5.6 GM/DL (ref 6.4–8.2)
PROTHROMBIN TIME: 10.6 SEC (ref 9.8–11.6)
RBC # BLD AUTO: 4.42 MIL/MM3 (ref 4–5.3)
SODIUM SERPL-SCNC: 145 MEQ/L (ref 136–145)
SP GR UR STRIP: 1.05 (ref 1–1.03)
SQUAMOUS #/AREA URNS HPF: 2 /HPF (ref 0–5)
TROPONIN I SERPL-MCNC: (no result) NG/ML (ref 0.02–0.05)
URINE LEUKOCYTE ESTERASE: (no result)
WBC # BLD AUTO: 2.4 TH/MM3 (ref 4–11)

## 2018-06-20 PROCEDURE — 83921 ORGANIC ACID SINGLE QUANT: CPT

## 2018-06-20 PROCEDURE — 96374 THER/PROPH/DIAG INJ IV PUSH: CPT

## 2018-06-20 PROCEDURE — G0378 HOSPITAL OBSERVATION PER HR: HCPCS

## 2018-06-20 PROCEDURE — 71045 X-RAY EXAM CHEST 1 VIEW: CPT

## 2018-06-20 PROCEDURE — 85610 PROTHROMBIN TIME: CPT

## 2018-06-20 PROCEDURE — 84484 ASSAY OF TROPONIN QUANT: CPT

## 2018-06-20 PROCEDURE — 99285 EMERGENCY DEPT VISIT HI MDM: CPT

## 2018-06-20 PROCEDURE — 70450 CT HEAD/BRAIN W/O DYE: CPT

## 2018-06-20 PROCEDURE — A9579 GAD-BASE MR CONTRAST NOS,1ML: HCPCS

## 2018-06-20 PROCEDURE — 70548 MR ANGIOGRAPHY NECK W/DYE: CPT

## 2018-06-20 PROCEDURE — 80053 COMPREHEN METABOLIC PANEL: CPT

## 2018-06-20 PROCEDURE — 93005 ELECTROCARDIOGRAM TRACING: CPT

## 2018-06-20 PROCEDURE — 85025 COMPLETE CBC W/AUTO DIFF WBC: CPT

## 2018-06-20 PROCEDURE — 74177 CT ABD & PELVIS W/CONTRAST: CPT

## 2018-06-20 PROCEDURE — 72157 MRI CHEST SPINE W/O & W/DYE: CPT

## 2018-06-20 PROCEDURE — 84439 ASSAY OF FREE THYROXINE: CPT

## 2018-06-20 PROCEDURE — 83735 ASSAY OF MAGNESIUM: CPT

## 2018-06-20 PROCEDURE — 82550 ASSAY OF CK (CPK): CPT

## 2018-06-20 PROCEDURE — 94640 AIRWAY INHALATION TREATMENT: CPT

## 2018-06-20 PROCEDURE — 82607 VITAMIN B-12: CPT

## 2018-06-20 PROCEDURE — 83690 ASSAY OF LIPASE: CPT

## 2018-06-20 PROCEDURE — 85730 THROMBOPLASTIN TIME PARTIAL: CPT

## 2018-06-20 PROCEDURE — 96361 HYDRATE IV INFUSION ADD-ON: CPT

## 2018-06-20 PROCEDURE — 85652 RBC SED RATE AUTOMATED: CPT

## 2018-06-20 PROCEDURE — 72156 MRI NECK SPINE W/O & W/DYE: CPT

## 2018-06-20 PROCEDURE — 70544 MR ANGIOGRAPHY HEAD W/O DYE: CPT

## 2018-06-20 PROCEDURE — 86592 SYPHILIS TEST NON-TREP QUAL: CPT

## 2018-06-20 PROCEDURE — 70553 MRI BRAIN STEM W/O & W/DYE: CPT

## 2018-06-20 PROCEDURE — 94664 DEMO&/EVAL PT USE INHALER: CPT

## 2018-06-20 PROCEDURE — 96376 TX/PRO/DX INJ SAME DRUG ADON: CPT

## 2018-06-20 PROCEDURE — 84443 ASSAY THYROID STIM HORMONE: CPT

## 2018-06-20 PROCEDURE — 96375 TX/PRO/DX INJ NEW DRUG ADDON: CPT

## 2018-06-20 PROCEDURE — 81001 URINALYSIS AUTO W/SCOPE: CPT

## 2018-06-20 PROCEDURE — 80048 BASIC METABOLIC PNL TOTAL CA: CPT

## 2018-06-20 RX ADMIN — PHENYTOIN SODIUM SCH MLS/HR: 50 INJECTION INTRAMUSCULAR; INTRAVENOUS at 15:25

## 2018-06-20 RX ADMIN — IBUPROFEN PRN MG: 600 TABLET, FILM COATED ORAL at 21:57

## 2018-06-20 RX ADMIN — Medication SCH ML: at 21:00

## 2018-06-20 RX ADMIN — SODIUM CHLORIDE PRN MG: 900 INJECTION, SOLUTION INTRAVENOUS at 17:55

## 2018-06-20 NOTE — RADRPT
EXAM DATE:  6/20/2018 10:51 AM EDT

AGE/SEX:        53 years / Female



INDICATIONS:  Vomiting, dizziness.



CLINICAL DATA:  This is the patient's initial encounter. Patient reports that signs and symptoms have
 been present for 3 days and indicates a pain score of 0/10. 

                                                                          

MEDICAL/SURGICAL HISTORY:       Chronic obstructive pulmonary disease. None.



COMPARISON:      [facility], CHEST SINGLE AP, 4/6/2018.  . 





FINDINGS:  

No new focal pleural or parenchymal opacities. Cardiomediastinal contours are within normal limits. B
sophia thorax is intact.



CONCLUSION: 

1.  No acute abnormality or significant interval change.



Electronically signed by: Ugo Dockery MD  6/20/2018 10:54 AM EDT

## 2018-06-20 NOTE — RADRPT
EXAM DATE:  2018 12:40 PM EDT

AGE/SEX:        53 years / Female



INDICATIONS:  Diarrhea, nausea and vomiting. 



CLINICAL DATA:  This is the patient's initial encounter. Patient reports that signs and symptoms have
 been present for 2 days and indicates a pain score of 0/10. 

                                                                          

MEDICAL/SURGICAL HISTORY:       Chronic obstructive pulmonary disease. Cholecystectomy.   sec
tion. Hernia repair.



ORAL CONTRAST:   No oral contrast ingested.



RADIATION DOSE:  6.64 CTDI (mGy)









COMPARISON:      [facility] [place], [place] ABDOMEN & PELVIS W CONTRAST, 4/10/2018.  . 





TECHNIQUE:  Multiple contiguous axial images were obtained through the abdomen and pelvis following b
olus infusion of  97 ml Omnipaque 350 (iohexol)  nonionic water-soluble contrast as a single exam dos
e. No oral contrast ingested.  Using automated exposure control and adjustment of the mA and/or kV ac
cording to patient size, radiation dose was kept as low as reasonably achievable to obtain optimal di
agnostic quality images.  DICOM format image data is available electronically for review and comparis
on. 



FINDINGS: 

Lower Lungs: The visualized lower lungs are clear.

Liver: Stable diffuse fatty infiltration throughout the liver. The gallbladder has been surgically re
moved. No biliary duct dilatation. No significant change compared to the prior study.

Spleen:  Homogeneous density without enlargement.

Pancreas:  Unremarkable without mass or calcification.

Kidneys:  Normal in size and shape. No evidence of mass or hydronephrosis. Tiny stable cyst upper roberto
e right kidney.

Adrenal Glands:   Unremarkable.

Aorta:   The aorta and proximal iliac vessels are grossly unremarkable without aneurysmal dilation.

Bowel/Mesentery:  The bowel gas pattern is within normal limits. There is no evidence of obstruction.
 There is some nonspecific thickening involving the cecum, and ascending colon, transverse colon and 
the descending colon to its midportion.. The distal descending colon and sigmoid colon is within norm
al limits.

Abdominal Wall:  Stable.

Retroperitoneum:  No evidence of adenopathy in the retrocrural, para-aortic, or deep pelvic regions.

Bladder:  Contours are smooth.

Reproductive Organs:  There is a right adnexal cyst measuring approximately 2.7 cm. This is most like
ly a right ovarian cyst. This was present and stable compared to the prior study.

Inguinal:  The inguinal region is unremarkable without evidence of adenopathy.

Bony Structures:  Stable degenerative changes.



CONCLUSION:

1.  On today's examination, there is some nonspecific thickening involving the wall of the cecum, asc
ending colon, transverse colon and the proximal portion of the descending colon. This is nonspecific 
but could represent  colitis versus a decompressed colon. Recommend correlation with patient's physic
al, clinical exam and laboratory values.

2.  Stable fatty infiltration of the liver.

3.  Stable 2.7 cm right ovarian cyst.



Electronically signed by: Mitch Solorzano MD  2018 12:58 PM EDT

## 2018-06-20 NOTE — EKG
Date Performed: 06/20/2018       Time Performed: 10:58:34

 

PTAGE:      53 years

 

EKG:      Sinus rhythm 

 

 BORDERLINE RIGHT AXIS DEVIATION BORDERLINE ECG

 

PREVIOUS TRACING       : 04/06/2018 11.28

 

DOCTOR:   Leno Hernandez  Interpretating Date/Time  06/20/2018 14:19:02

## 2018-06-20 NOTE — RADRPT
EXAM DATE:  2018 12:35 PM EDT

AGE/SEX:        53 years / Female



INDICATIONS:  Dizziness, nausea and vomiting. 



CLINICAL DATA:  This is the patient's initial encounter. Patient reports that signs and symptoms have
 been present for 1 day and indicates a pain score of 0/10. 

                                                                          

MEDICAL/SURGICAL HISTORY:   Chronic obstructive pulmonary disease. Cholecystectomy.   section
. Hernia repair.



RADIATION DOSE:  56.35 CTDI (mGy)







COMPARISON:      No prior exams available for comparison. 







TECHNIQUE:  CT of the head without contrast.  Using automated exposure control and adjustment of the 
mA and/or kV according to patient size, radiation dose was kept as low as reasonably achievable to ob
tain optimal diagnostic quality images.  DICOM format image data is available electronically for revi
ew and comparison. 



FINDINGS: 

Cerebrum:  The ventricles are normal for age.  No evidence of midline shift, mass lesion, hemorrhage 
or acute infarction.  No extraaxial fluid collections are seen.

Posterior Fossa:  The cerebellum and brainstem are intact.  The 4th ventricle is midline.  The cerebe
llopontine angle is unremarkable.

Extracranial:  The visualized portion of the orbits is intact. It appears the patient is status post 
sinus surgery.

Skull:  The calvaria is intact.  No evidence of skull fracture.



CONCLUSION:

No acute intracranial abnormality.



Electronically signed by: Familia Robison MD  2018 12:36 PM EDT

## 2018-06-21 VITALS
TEMPERATURE: 98.6 F | OXYGEN SATURATION: 93 % | DIASTOLIC BLOOD PRESSURE: 63 MMHG | SYSTOLIC BLOOD PRESSURE: 121 MMHG | HEART RATE: 71 BPM | RESPIRATION RATE: 18 BRPM

## 2018-06-21 VITALS
TEMPERATURE: 98 F | RESPIRATION RATE: 18 BRPM | SYSTOLIC BLOOD PRESSURE: 126 MMHG | OXYGEN SATURATION: 94 % | DIASTOLIC BLOOD PRESSURE: 58 MMHG | HEART RATE: 74 BPM

## 2018-06-21 VITALS
HEART RATE: 95 BPM | DIASTOLIC BLOOD PRESSURE: 79 MMHG | RESPIRATION RATE: 16 BRPM | OXYGEN SATURATION: 93 % | SYSTOLIC BLOOD PRESSURE: 140 MMHG | TEMPERATURE: 98.3 F

## 2018-06-21 VITALS
DIASTOLIC BLOOD PRESSURE: 69 MMHG | HEART RATE: 78 BPM | OXYGEN SATURATION: 90 % | TEMPERATURE: 98.6 F | RESPIRATION RATE: 12 BRPM | SYSTOLIC BLOOD PRESSURE: 116 MMHG

## 2018-06-21 VITALS
RESPIRATION RATE: 12 BRPM | SYSTOLIC BLOOD PRESSURE: 124 MMHG | DIASTOLIC BLOOD PRESSURE: 74 MMHG | OXYGEN SATURATION: 92 % | TEMPERATURE: 98.4 F | HEART RATE: 78 BPM

## 2018-06-21 VITALS
DIASTOLIC BLOOD PRESSURE: 63 MMHG | RESPIRATION RATE: 16 BRPM | SYSTOLIC BLOOD PRESSURE: 134 MMHG | HEART RATE: 73 BPM | TEMPERATURE: 98.4 F | OXYGEN SATURATION: 91 %

## 2018-06-21 VITALS — OXYGEN SATURATION: 92 %

## 2018-06-21 LAB
BASOPHILS # BLD AUTO: 0 TH/MM3 (ref 0–0.2)
BASOPHILS NFR BLD: 0.1 % (ref 0–2)
BUN SERPL-MCNC: 6 MG/DL (ref 7–18)
CALCIUM SERPL-MCNC: 8.6 MG/DL (ref 8.5–10.1)
CHLORIDE SERPL-SCNC: 105 MEQ/L (ref 98–107)
CREAT SERPL-MCNC: 0.38 MG/DL (ref 0.5–1)
EOSINOPHIL # BLD: 0 TH/MM3 (ref 0–0.4)
EOSINOPHIL NFR BLD: 0.4 % (ref 0–4)
ERYTHROCYTE [DISTWIDTH] IN BLOOD BY AUTOMATED COUNT: 14 % (ref 11.6–17.2)
GFR SERPLBLD BASED ON 1.73 SQ M-ARVRAT: 177 ML/MIN (ref 89–?)
GLUCOSE SERPL-MCNC: 102 MG/DL (ref 74–106)
HCO3 BLD-SCNC: 27.9 MEQ/L (ref 21–32)
HCT VFR BLD CALC: 43.2 % (ref 35–46)
HGB BLD-MCNC: 14.6 GM/DL (ref 11.6–15.3)
LYMPHOCYTES # BLD AUTO: 1.4 TH/MM3 (ref 1–4.8)
LYMPHOCYTES NFR BLD AUTO: 32.4 % (ref 9–44)
MCH RBC QN AUTO: 33.8 PG (ref 27–34)
MCHC RBC AUTO-ENTMCNC: 33.8 % (ref 32–36)
MCV RBC AUTO: 100 FL (ref 80–100)
MONOCYTE #: 0.3 TH/MM3 (ref 0–0.9)
MONOCYTES NFR BLD: 7.5 % (ref 0–8)
NEUTROPHILS # BLD AUTO: 2.5 TH/MM3 (ref 1.8–7.7)
NEUTROPHILS NFR BLD AUTO: 59.6 % (ref 16–70)
PLATELET # BLD: 105 TH/MM3 (ref 150–450)
PMV BLD AUTO: 8.7 FL (ref 7–11)
RBC # BLD AUTO: 4.32 MIL/MM3 (ref 4–5.3)
SODIUM SERPL-SCNC: 142 MEQ/L (ref 136–145)
T4 FREE SERPL-MCNC: 0.8 NG/DL (ref 0.76–1.46)
VIT B12 SERPL-MCNC: 681 PG/ML (ref 193–986)
WBC # BLD AUTO: 4.2 TH/MM3 (ref 4–11)

## 2018-06-21 RX ADMIN — POTASSIUM BICARBONATE AND POTASSIUM CHLORIDE EFFERVESCENT TABLETS FOR ORAL SOLUTION SCH MEQ: 1.25; .7; 1.5 TABLET, EFFERVESCENT ORAL at 21:54

## 2018-06-21 RX ADMIN — PHENYTOIN SODIUM SCH MLS/HR: 50 INJECTION INTRAMUSCULAR; INTRAVENOUS at 21:00

## 2018-06-21 RX ADMIN — SODIUM CHLORIDE PRN MG: 900 INJECTION, SOLUTION INTRAVENOUS at 12:37

## 2018-06-21 RX ADMIN — ESCITALOPRAM OXALATE SCH MG: 10 TABLET, FILM COATED ORAL at 08:26

## 2018-06-21 RX ADMIN — IBUPROFEN PRN MG: 600 TABLET, FILM COATED ORAL at 23:10

## 2018-06-21 RX ADMIN — PHENYTOIN SODIUM SCH MLS/HR: 50 INJECTION INTRAMUSCULAR; INTRAVENOUS at 01:00

## 2018-06-21 RX ADMIN — PHENYTOIN SODIUM SCH MLS/HR: 50 INJECTION INTRAMUSCULAR; INTRAVENOUS at 17:27

## 2018-06-21 RX ADMIN — Medication SCH ML: at 08:26

## 2018-06-21 RX ADMIN — Medication SCH ML: at 10:30

## 2018-06-21 RX ADMIN — IPRATROPIUM BROMIDE AND ALBUTEROL SULFATE SCH AMPULE: .5; 3 SOLUTION RESPIRATORY (INHALATION) at 19:49

## 2018-06-21 RX ADMIN — Medication SCH ML: at 21:54

## 2018-06-21 NOTE — RADRPT
EXAM DATE:  2018 2:09 PM EDT

AGE/SEX:        53 years / Female



INDICATIONS:  Stenosis.



CLINICAL DATA:  This is the patient's subsequent encounter. Patient reports that signs and symptoms h
ave been present for 2 days and indicates a pain score of 0/10. 

                                                                          

MEDICAL/SURGICAL HISTORY:       Hypercholesterolemia.  Chronic obstructive pulmonary disease.  Divert
iculitis. Tonsillectomy.  Cholecystectomy.   section.  Hernia.



COMPARISON:      No prior exams available for comparison. 





TECHNIQUE:   20 ml Omniscan (gadodiamide) contrast infused MRA (single exam dose) of the extracranial
 circulation was performed using a neurovascular coil.  Postprocessing was performed, including rotat
ing sub-volume maximum intensity projections of each carotid artery, rotating full-volume maximum int
ensity projections of both carotid arteries, sagittal and coronal sliding thin-slab reformations of e
ach carotid artery, and left oblique sliding thin-slab reformation through the aortic arch to include
 the origin of the arch branch vessels.







FINDINGS:  

Aortic Arch :   There is a three-vessel origin of the great vessels from the aorta.  No evidence of o
stial narrowing.

Right Carotid :  The common carotid artery is intact.  The carotid bulb has a normal configuration wi
thout ulceration or narrowing.  The internal carotid artery lumen is smooth without stenosis.  The ex
ternal carotid artery is intact.

Left Carotid :  The common carotid artery is intact.  The carotid bulb has a normal configuration wit
hout ulceration or narrowing.  The internal carotid artery lumen is smooth without stenosis.  The ext
ernal carotid artery is intact.

Vertebrals :  The vertebral arteries are patent. There is symmetric with the left vertebral artery be
ing larger than the right vertebral artery. Some variation in size the vertebral arteries is normal. 
  No stenotic lesions are seen.



CONCLUSION: 

Negative MRA of the neck.



_____________________________________________________________________________

Percent stenosis is calculated using the diameter of the stenotic region over the diameter of the nor
mal distal internal carotid artery

_____________________________________________________________________________





Electronically signed by: Familia Robison MD  2018 4:48 PM EDT

## 2018-06-21 NOTE — RADRPT
EXAM DATE:  2018 1:55 PM EDT

AGE/SEX:        53 years / Female



INDICATIONS:    Myelopathy.



CLINICAL DATA:  This is the patient's subsequent encounter. Patient reports that signs and symptoms h
ave been present for 1 day and indicates a pain score of 0/10. 

                                                                          

MEDICAL/SURGICAL HISTORY:       Chronic obstructive pulmonary disease.  Hypercholesterolemia.  Divert
iculitis. Tonsillectomy.  Cholecystectomy.   section.  Hernia.



COMPARISON:      No prior exams available for comparison. 





TECHNIQUE:  Multiplanar, multisequence MRI of the thoracic spine was performed without and with 20 ml
 Omniscan (gadodiamide) contrast as a single exam dose.







FINDINGS:  

Vertebrae:  Normal vertebral body height.  Homogeneous marrow signal.

Alignment:  Normal.

Cord:  Normal position and configuration.

Post Contrast:  No abnormal areas of enhancement are seen in the cord, dural or paraspinal regions.



T1-T2:  The thecal sac has a normal diameter. No evidence of disc bulge or protrusion.

T2-T3:  The thecal sac has a normal diameter.  No evidence of disc bulge or protrusion.  

T3-T4:  The thecal sac has a normal diameter.  No evidence of disc bulge or protrusion.  

T4-T5:  The thecal sac has a normal diameter.  No evidence of disc bulge or protrusion.  

T5-T6:  The thecal sac has a normal diameter.  No evidence of disc bulge or protrusion.  

T6-T7:  There is a mild left lateral recess disc protrusion without significant stenosis.

T7-T8:  The thecal sac has a normal diameter.  No evidence of disc bulge or protrusion.  

T8-T9:  There is a mild left lateral recess disc protrusion without significant stenosis.

T9-T10:  There is a mild left lateral recess disc protrusion without significant stenosis.

T10-T11:  There is a mild right paracentral disc protrusion without significant stenosis.

T11-T12:  The thecal sac has a normal diameter.  No evidence of disc bulge or protrusion.  

T12-L1:  The thecal sac has a normal diameter.  No evidence of disc bulge or protrusion.  



CONCLUSION: 

1.  The cord appears normal throughout.

2.  Mild disc protrusions at the mid and lower thoracic spine as described above.





Electronically signed by: Familia Robison MD  2018 4:47 PM EDT

## 2018-06-21 NOTE — RADRPT
EXAM DATE:  2018 2:06 PM EDT

AGE/SEX:        53 years / Female



INDICATIONS:    CVA.



CLINICAL DATA:  This is the patient's subsequent encounter. Patient reports that signs and symptoms h
ave been present for 2 days and indicates a pain score of 0/10. 

                                                                          

MEDICAL/SURGICAL HISTORY:       Chronic obstructive pulmonary disease.  Diverticulitis.  Hypercholest
erolemia. Tonsillectomy.  Cholecystectomy.   section.  Hernia.



COMPARISON:      No prior exams available for comparison. 





TECHNIQUE: Multiplanar, multisequence examination of the brain was performed without and with 20 ml O
mniscan (gadodiamide) contrast as a single exam dose.







FINDINGS:     

Cerebrum:  The ventricles are normal for age.  No evidence of midline shift, mass lesion, hemorrhage 
or acute infarction.  No extraaxial fluid collections are seen.  The pituitary gland and suprasellar 
cistern are normal in configuration.

White Matter:  No significant signal abnormalities are seen in the white matter.

Posterior Fossa: The cerebellum and brainstem are intact.  The 4th ventricle is midline.  The cerebel
lopontine angle is unremarkable.  The cerebellar tonsils are normal in position.

Diffusion Imaging:  No focal areas of restricted diffusion are seen.  No evidence of acute infarction
.

Extracranial:  The visualized portions of the orbits and paranasal sinuses are unremarkable.

Post Contrast:  No abnormal areas of parenchymal or dural enhancement.  No evidence of blood-brain ba
rrier breakdown.



CONCLUSION: 

Negative brain MRI examination.





Electronically signed by: Familia Robison MD  2018 4:40 PM EDT

## 2018-06-21 NOTE — MB
cc:

Danny Goss MD

****

 

 

DATE:

06/21/2018

 

HISTORY OF PRESENT ILLNESS:

A 53-year-old right-handed woman with a history of really being 

totally healthy except for ulcerative colitis.  She has got some 

chronic diarrhea.

 

Last Sunday, about 5 days ago, she began to feel a little bit of 

achiness in her left knee, some tingling and numbness in her feet, 

which she thinks has gotten a little bit worse and then about 3 a.m. 

this morning she woke up vomiting and went to sit up and had vertigo. 

Still feels a little dizzy if she sits up.

 

She had a mild bifrontal headache.

 

Never had that before.  No sudden hearing loss or ringing in the ears.

 

REVIEW OF SYSTEMS:

Denies hypertension, diabetes, hypercholesterolemia, MI, CABG, cardiac

arrhythmia, stent, angioplasty, A. Fib, Coumadin, renal, hepatic, or 

pulmonary disease, thyroid disease, lupus, ulcer, cancer, seizure, or 

stroke.

 

SOCIAL HISTORY:

She is a smoker, occasionally has a drink.  Lives with her .

 

FAMILY HISTORY:

Positive for cancer.  Negative for seizure or stroke.

 

ALLERGIES:

SHE IS ALLERGIC TO CODEINE, DEMEROL, ACETAMINOPHEN AND MORPHINE.

 

MEDICATIONS:

She takes Lomotil and escitalopram 10 mg once a day.

 

PHYSICAL EXAMINATION:

VITAL SIGNS:  Afebrile,  74, 18, 126/58.

NECK:  There were no carotid bruits.

HEART:  Regular rhythm.  I did not detect a murmur.

NEUROLOGIC:  Pupils are equal.  Visual fields are full.  Extraocular 

movements intact without nystagmus.  Face is symmetric with normal 

sensation.  Tongue was midline.  There is no drift.  She has normal 

strength in the upper and lower extremities bilaterally.  DTRs are 

2-3+ symmetric at the knees.  Toes are downgoing bilaterally.  There 

are a few beats of mild ankle clonus bilaterally.  Tone was normal in 

the lower extremities, however.  She is not ataxic on finger-to-nose. 

She is able to sit up on her own.  Speech is fluent.  She is not 

aphasic.  Pinprick was intact except for slight decrease in the distal

toes on the right foot.  Vibratory sense was diminished at the ankles 

bilaterally.

 

LABORATORY DATA:

White count is low at 2.4.  It had been normal in April.  Platelet 

count is 111, had been 159 in April.  Hepatitis screen has been 

negative in the past.  GIA was normal in the past.  C. Difficile has 

been negative.  UA negative.

 

Basic metabolic profile essentially normal.  Glucose 125.  Magnesium 

normal.  Liver functions are elevated.  , .  Troponins 

negative.  Lipase low.  Coags normal.

 

She had an abdominal CT.  There is some thickening of the colon, fatty

liver.

 

Chest x-ray negative.

 

CAT scan of the brain read as normal.  Review of those films does 

appear to be normal.

 

IMPRESSION:

Probably some peripheral vestibulopathy, possibly some benign 

positional vertigo.

 

While she has a tingling of her feet is unclear.  She has a little bit

of ankle clonus and hyperreflexia.  I have ordered an MRI of the brain

with the vertigo and MRA of the neck and [place] of [place] along with 

MRI of the cervical spine and thoracic spine with the findings in her 

lower extremities.

 

We will check some additional blood work on her.  If everything is 

negative, I will check her Hallpike maneuver.  She has a little bit of

numbness in her feet, which is new.

 

LFTs are elevated and the white count is a little bit low.  If she has

a viral illness, it is unclear.  I will defer to the med team on that.

 Check some blood work on her also.  When her reflexes intact, no 

evidence for Guillain-[place].

 

 

__________________________________

MD URVASHI Munguia/DRAGAN

D: 06/21/2018, 09:13 AM

T: 06/21/2018, 09:38 AM

Visit #: [id number]

Job #: [id number]

## 2018-06-21 NOTE — RADRPT
EXAM DATE:  2018 1:57 PM EDT

AGE/SEX:        53 years / Female



INDICATIONS:    Myelopathy.



CLINICAL DATA:  This is the patient's subsequent encounter. Patient reports that signs and symptoms h
ave been present for 2 days and indicates a pain score of 0/10. 

                                                                          

MEDICAL/SURGICAL HISTORY:       Hypercholesterolemia.  Chronic obstructive pulmonary disease.  Divert
iculitis. Tonsillectomy.  Cholecystectomy.   section.  Hernia.



COMPARISON:      No prior exams available for comparison. 





TECHNIQUE:  Multiplanar, multisequence MRI examination of the cervical spine was performed without an
d with 20  ml Omniscan (gadodiamide) contrast as a single exam dose.







FINDINGS:  

Vertebrae:  Normal vertebral body height. There are type II endplate changes at the C4-C5, C5-C6 and 
C6-C7 levels..

Alignment:  Normal.

Cord:  Normal configuration and signal.

Post Fossa:  The cerebellar tonsils are normal in position.

Post Contrast:  No abnormal areas of enhancement are seen.



C2-C3:  The thecal sac has a normal configuration.  There is no evidence of disc herniation or spinal
 canal stenosis.  The neural foramina are patent bilaterally. There is mild left facet hypertrophy.

C3-C4:  The thecal sac has a normal configuration.  There is no evidence of disc herniation or spinal
 canal stenosis.  The neural foramina are patent bilaterally.

C4-C5:  The disc demonstrates decreased signal and mild loss of height. There is minimal bulging with
out significant stenosis. There is some uncovertebral hypertrophy with neural foraminal narrowing.

C5-C6:  The disc demonstrates decreased signal and mild loss of height. There is minimal bulging with
out significant stenosis. The neural foramina are grossly patent.

C6-C7:  The disc demonstrates decreased signal and mild loss of height. There is minimal bulging with
out significant stenosis. The neural foramina are grossly patent.

C7-T1:  No epidural impressions seen.



CONCLUSION: 

1.  The cord appears normal throughout.

2.  Degenerative change in the mid cervical spine.



Electronically signed by: Familia Robison MD  2018 4:44 PM EDT

## 2018-06-21 NOTE — HHI.HP
History of Present Illness


Primary Care Physician


Jamey Greenberg, DO


Admission Diagnosis





Colitis/Nausea/Vomitting/Diarrhea/Vertigo


Diagnoses:  


(1) Diarrhea


(2) Nausea and vomiting in adult patient


(3) Colitis, acute


(4) COPD with acute exacerbation


(5) Vertigo





Review of Systems


Except as stated in HPI:  all other systems reviewed are Neg





Past Family Social History


Allergies:  


Coded Allergies:  


     codeine (Unverified  Allergy, Severe, INCREASED LIVER ENZYMES, 18)


     meperidine (Unverified  Allergy, Severe, UNKNOWN, 18)


     acetaminophen (Unverified  Adverse Reaction, Severe, LIVER PROBLEMS, )


     morphine (Unverified  Adverse Reaction, Severe, LIVER PROBLEMS, 18)


Past Medical History


CAD


COPD


Colitis


HLD


Depression


Past Surgical History


Cholecystectomy


Hernia Repair


Reported Medications





Reported


Lomotil (Diphenoxylate-Atropine) 2.5-0.025 Mg Tab 2 Tab PO Q4HR PRN


Escitalopram (Escitalopram Oxalate) 10 Mg Tab 10 Mg PO DAILY


Active Ordered Medications





Current Medications








 Medications


  (Trade)  Dose


 Ordered  Sig/Sahil


 Route  Start Time


 Stop Time Status Last Admin


 


 Sodium Chloride  1,000 ml @ 


 100 mls/hr  Q10H


 IV  18 15:00


    18 01:00


 


 


  (NS Flush)  2 ml  UNSCH  PRN


 IV FLUSH  18 15:00


     


 


 


  (NS Flush)  2 ml  BID


 IV FLUSH  18 21:00


     


 


 


  (Reglan Inj)  5 mg  Q6H  PRN


 IV PUSH  18 15:00


    18 17:55


 


 


  (Narcan Inj)  0.4 mg  UNSCH  PRN


 IV PUSH  18 15:00


     


 


 


  (Milk Of


 Magnesia Liq)  30 ml  Q12H  PRN


 PO  18 15:00


     


 


 


  (Senokot)  17.2 mg  Q12H  PRN


 PO  18 15:00


     


 


 


  (Dulcolax Supp)  10 mg  DAILY  PRN


 RECTAL  18 15:00


     


 


 


  (Lactulose Liq)  30 ml  DAILY  PRN


 PO  18 15:00


     


 


 


  (Lexapro)  10 mg  DAILY


 PO  18 09:00


    18 08:26


 


 


  (Motrin)  600 mg  Q6H  PRN


 PO  6/20/18 19:00


    18 21:57


 


 


  (Ativan Inj)  1 mg  Q4H  PRN


 IV  18 19:00


     


 








Social History


Positive smoker 1  day


Denies Substance abuse


Social ETOH





Physical Exam


Vital Signs





Vital Signs








  Date Time  Temp Pulse Resp B/P (MAP) Pulse Ox O2 Delivery O2 Flow Rate FiO2


 


18 04:11 98.0 74 18 126/58 (80) 94   


 


18 00:32 98.6 71 18 121/63 (82) 93   


 


18 22:31   18     


 


18 20:16 98.7 80 16 114/54 (74) 95   


 


18 15:25 98.4 88 20 127/74 (91) 95   


 


18 15:14        


 


18 14:06  90 19 127/60 (82) 94 Nasal Cannula 2.00 


 


18 10:11 97.9 70 17 142/61 (88) 92   








Physical Exam


GENERAL: This is a well-nourished, well-developed patient, in no apparent 

distress.


SKIN: No rashes, ecchymoses or lesions. Cool and dry.


HEAD: Atraumatic. Normocephalic. No temporal or scalp tenderness.


EYES: Pupils equal round and reactive. Extraocular motions intact. No scleral 

icterus. No injection or drainage. 


ENT: Nose without bleeding, purulent drainage or septal hematoma Airway patent.


NECK: Trachea midline. 


CARDIOVASCULAR: Regular rate and rhythm without murmurs, gallops, or rubs. 


RESPIRATORY: Breath sounds equal bilaterally. scattered wheezes


GASTROINTESTINAL: Abdomen soft, tender, No guarding.


MUSCULOSKELETAL: Extremities without clubbing, cyanosis, or edema. No joint 

tenderness, effusion, or edema noted. No calf tenderness. Negative Homans sign 

bilaterally.


NEUROLOGICAL: Awake and alert.  Normal speech.


Laboratory





Laboratory Tests








Test


  18


10:28 18


20:00


 


White Blood Count 2.4  


 


Red Blood Count 4.42  


 


Hemoglobin 14.9  


 


Hematocrit 43.9  


 


Mean Corpuscular Volume 99.4  


 


Mean Corpuscular Hemoglobin 33.7  


 


Mean Corpuscular Hemoglobin


Concent 33.9 


  


 


 


Red Cell Distribution Width 14.3  


 


Platelet Count 111  


 


Mean Platelet Volume 8.6  


 


Neutrophils (%) (Auto) 81.4  


 


Lymphocytes (%) (Auto) 13.1  


 


Monocytes (%) (Auto) 5.3  


 


Eosinophils (%) (Auto) 0.0  


 


Basophils (%) (Auto) 0.2  


 


Neutrophils # (Auto) 2.0  


 


Lymphocytes # (Auto) 0.3  


 


Monocytes # (Auto) 0.1  


 


Eosinophils # (Auto) 0.0  


 


Basophils # (Auto) 0.0  


 


CBC Comment DIFF FINAL  


 


Differential Comment   


 


Prothrombin Time 10.6  


 


Prothromb Time International


Ratio 1.0 


  


 


 


Activated Partial


Thromboplast Time 21.8 


  


 


 


Blood Urea Nitrogen 5  


 


Creatinine 0.29  


 


Random Glucose 125  


 


Total Protein 5.6  


 


Albumin 3.1  


 


Calcium Level 7.7  


 


Magnesium Level 1.8  


 


Alkaline Phosphatase 244  


 


Aspartate Amino Transf


(AST/SGOT) 154 


  


 


 


Alanine Aminotransferase


(ALT/SGPT) 139 


  


 


 


Total Bilirubin 0.9  


 


Sodium Level 145  


 


Potassium Level 3.8  


 


Chloride Level 105  


 


Carbon Dioxide Level 27.8  


 


Anion Gap 12  


 


Estimat Glomerular Filtration


Rate 242 


  


 


 


Total Creatine Kinase 47  


 


Troponin I LESS THAN 0.02  


 


Lipase 44  


 


Urine Color  YELLOW 


 


Urine Turbidity  CLEAR 


 


Urine pH  6.0 


 


Urine Specific Gravity  1.053 


 


Urine Protein  NEG 


 


Urine Glucose (UA)  NEG 


 


Urine Ketones  20 


 


Urine Occult Blood  NEG 


 


Urine Nitrite  NEG 


 


Urine Bilirubin  NEG 


 


Urine Urobilinogen  2.0 


 


Urine Leukocyte Esterase  NEG 


 


Urine WBC  1 


 


Urine Squamous Epithelial


Cells 


  2 


 


 


Microscopic Urinalysis Comment


  


  CULT NOT


INDICATED








Result Diagram:  


18 1028                                                                   

             18 1028





Imaging





Last 24 hours Impressions








Head CT 18 1019 Signed





Impressions: 





 CONCLUSION:





 No acute intracranial abnormality.





  





 


 


Chest X-Ray 18 1019 Signed





Impressions: 





 CONCLUSION: 





 1.  No acute abnormality or significant interval change.





  





 


 


Abdomen/Pelvis CT 18 1019 Signed





Impressions: 





 CONCLUSION:





 1.  On today's examination, there is some nonspecific thickening involving the 





 wall of the cecum, ascending colon, transverse colon and the proximal portion o





 f the descending colon. This is nonspecific but could represent  colitis versus





  a decompressed colon. Recommend correlation with patient's physical, clinical 





 exam and laboratory values.





 2.  Stable fatty infiltration of the liver.





 3.  Stable 2.7 cm right ovarian cyst.





  





 











Caprini VTE Risk Assessment


Caprini VTE Risk Assessment:  No/Low Risk (score <= 1)


Caprini Risk Assessment Model











 Point Value = 1          Point Value = 2  Point Value = 3  Point Value = 5


 


Age 41-60


Minor surgery


BMI > 25 kg/m2


Swollen legs


Varicose veins


Pregnancy or postpartum


History of unexplained or recurrent


   spontaneous 


Oral contraceptives or hormone


   replacement


Sepsis (< 1 month)


Serious lung disease, including


   pneumonia (< 1 month)


Abnormal pulmonary function


Acute myocardial infarction


Congestive heart failure (< 1 month)


History of inflammatory bowel disease


Medical patient at bed rest Age 61-74


Arthroscopic surgery


Major open surgery (> 45 min)


Laparoscopic surgery (> 45 min)


Malignancy


Confined to bed (> 72 hours)


Immobilizing plaster cast


Central venous access Age >= 75


History of VTE


Family history of VTE


Factor V Leiden


Prothrombin 09374C


Lupus anticoagulant


Anticardiolipin antibodies


Elevated serum homocysteine


Heparin-induced thrombocytopenia


Other congenital or acquired


   thrombophilia Stroke (< 1 month)


Elective arthroplasty


Hip, pelvis, or leg fracture


Acute spinal cord injury (< 1 month)








Prophylaxis Regimen











   Total Risk


Factor Score Risk Level Prophylaxis Regimen


 


0-1      Low Early ambulation


 


2 Moderate Order ONE of the following:


*Sequential Compression Device (SCD)


*Heparin 5000 units SQ BID


 


3-4 Higher Order ONE of the following medications:


*Heparin 5000 units SQ TID


*Enoxaparin/Lovenox 40 mg SQ daily (WT < 150 kg, CrCl > 30 mL/min)


*Enoxaparin/Lovenox 30 mg SQ daily (WT < 150 kg, CrCl > 10-29 mL/min)


*Enoxaparin/Lovenox 30 mg SQ BID (WT < 150 kg, CrCl > 30 mL/min)


AND/OR


*Sequential Compression Device (SCD)


 


5 or more Highest Order ONE of the following medications:


*Heparin 5000 units SQ TID (Preferred with Epidurals)


*Enoxaparin/Lovenox 40 mg SQ daily (WT < 150 kg, CrCl > 30 mL/min)


*Enoxaparin/Lovenox 30 mg SQ daily (WT < 150 kg, CrCl > 10-29 mL/min)


*Enoxaparin/Lovenox 30 mg SQ BID (WT < 150 kg, CrCl > 30 mL/min)


AND


*Sequential Compression Device (SCD)











Assessment and Plan


Problem List:  


(1) Colitis, acute


ICD Codes:  K52.9 - Noninfective gastroenteritis and colitis, unspecified


Status:  Acute


Plan:  IIVF, manage symptoms 





(2) Nausea and vomiting in adult patient


ICD Codes:  R11.2 - Nausea with vomiting, unspecified


Status:  Acute


Plan:  Antiemetics, IVF





(3) Vertigo


ICD Codes:  R42 - Dizziness and giddiness


Status:  Acute


Plan:  Meclizine prn, Neuro consult





(4) COPD with acute exacerbation


ICD Codes:  J44.1 - Chronic obstructive pulmonary disease with (acute) 

exacerbation


Status:  Acute


Plan:  Bronchodilators, maintain saturations














Radha Gabriel 2018 09:33

## 2018-06-21 NOTE — RADRPT
EXAM DATE:  2018 1:54 PM EDT

AGE/SEX:        53 years / Female



INDICATIONS:  CVA.



CLINICAL DATA:  This is the patient's subsequent encounter. Patient reports that signs and symptoms h
ave been present for 2 days and indicates a pain score of 0/10. 

                                                                          

MEDICAL/SURGICAL HISTORY:       Chronic obstructive pulmonary disease. Tonsillectomy.  Cholecystectom
y.   section.  Hernia.



COMPARISON:      No prior exams available for comparison. 





TECHNIQUE:     3D time-of-flight MRA was performed.  Source images, multiplanar STS MIP, and 3D volum
e MIP reconstructions were reviewed.



FINDINGS:     

Anterior circulation: The internal carotid arteries demonstrate no significant stenosis. Middle cereb
ral arteries demonstrate symmetric flow related enhancement. Left A1 segment is smaller than the righ
t. The more peripheral anterior cerebral arteries demonstrate no acute abnormality. No aneurysm or hi
gh-grade stenosis is visualized.



Posterior circulation: The right vertebral artery is not seen and may terminate into the posterior in
ferior cerebellar artery. Left vertebral artery is within normal limits. Basilar artery demonstrates 
no abnormality. There is symmetric flow related enhancement within the posterior cerebral arteries. N
o aneurysm is identified.



CONCLUSION: 

No intracranial arterial vascular abnormality is identified.





Electronically signed by: Familia Rhodes MD  2018 2:40 PM EDT

## 2018-06-22 VITALS
SYSTOLIC BLOOD PRESSURE: 121 MMHG | RESPIRATION RATE: 18 BRPM | OXYGEN SATURATION: 95 % | DIASTOLIC BLOOD PRESSURE: 64 MMHG | TEMPERATURE: 97.7 F | HEART RATE: 76 BPM

## 2018-06-22 VITALS
SYSTOLIC BLOOD PRESSURE: 130 MMHG | HEART RATE: 72 BPM | RESPIRATION RATE: 16 BRPM | TEMPERATURE: 98 F | OXYGEN SATURATION: 94 % | DIASTOLIC BLOOD PRESSURE: 69 MMHG

## 2018-06-22 VITALS — OXYGEN SATURATION: 96 %

## 2018-06-22 VITALS
TEMPERATURE: 98 F | DIASTOLIC BLOOD PRESSURE: 75 MMHG | HEART RATE: 65 BPM | OXYGEN SATURATION: 94 % | SYSTOLIC BLOOD PRESSURE: 113 MMHG | RESPIRATION RATE: 16 BRPM

## 2018-06-22 LAB
BUN SERPL-MCNC: 5 MG/DL (ref 7–18)
CALCIUM SERPL-MCNC: 8.1 MG/DL (ref 8.5–10.1)
CHLORIDE SERPL-SCNC: 107 MEQ/L (ref 98–107)
CREAT SERPL-MCNC: 0.33 MG/DL (ref 0.5–1)
GFR SERPLBLD BASED ON 1.73 SQ M-ARVRAT: 208 ML/MIN (ref 89–?)
GLUCOSE SERPL-MCNC: 89 MG/DL (ref 74–106)
HCO3 BLD-SCNC: 28.3 MEQ/L (ref 21–32)
SODIUM SERPL-SCNC: 143 MEQ/L (ref 136–145)

## 2018-06-22 RX ADMIN — ESCITALOPRAM OXALATE SCH MG: 10 TABLET, FILM COATED ORAL at 09:35

## 2018-06-22 RX ADMIN — IPRATROPIUM BROMIDE AND ALBUTEROL SULFATE SCH AMPULE: .5; 3 SOLUTION RESPIRATORY (INHALATION) at 05:22

## 2018-06-22 RX ADMIN — IPRATROPIUM BROMIDE AND ALBUTEROL SULFATE SCH AMPULE: .5; 3 SOLUTION RESPIRATORY (INHALATION) at 07:45

## 2018-06-22 RX ADMIN — PHENYTOIN SODIUM SCH MLS/HR: 50 INJECTION INTRAMUSCULAR; INTRAVENOUS at 08:30

## 2018-06-22 RX ADMIN — Medication SCH ML: at 09:34

## 2018-06-22 RX ADMIN — POTASSIUM BICARBONATE AND POTASSIUM CHLORIDE EFFERVESCENT TABLETS FOR ORAL SOLUTION SCH MEQ: 1.25; .7; 1.5 TABLET, EFFERVESCENT ORAL at 09:35

## 2018-06-22 NOTE — HHI.PR
Subjective


Remarks


no more vertigo still some tender feet





Objective





Vital Signs








  Date Time  Temp Pulse Resp B/P (MAP) Pulse Ox O2 Delivery O2 Flow Rate FiO2


 


6/22/18 04:40 97.7 76 18 121/64 (83) 95   


 


6/22/18 00:09 98.0 65 16 113/75 (88) 94   


 


6/22/18 00:00   18     


 


6/21/18 20:59 98.4 73 16 134/63 (86) 91   


 


6/21/18 19:51     92   21


 


6/21/18 16:03 98.6 78 12 116/69 (85) 90   


 


6/21/18 12:00 98.4 78 12 124/74 (91) 92   


 


6/21/18 08:00 98.3 95 16 140/79 (99) 93   














I/O      


 


 6/21/18 6/21/18 6/21/18 6/22/18 6/22/18 6/22/18





 07:00 15:00 23:00 07:00 15:00 23:00


 


Intake Total 500 ml  1160 ml   


 


Output Total 450 ml     


 


Balance 50 ml  1160 ml   


 


      


 


Intake Oral 500 ml  1160 ml   


 


Output Urine Total 450 ml     


 


# Voids 2  4   








Result Diagram:  


6/21/18 1028                                                                   

             6/21/18 1028





Objective Remarks


alert nad hallpike neg





Assessment and Plan


Assessment and Plan


imp  





lab neg





mri/a/a and mri c spine and t spine neg





hallpike neg





ok dc


periph vestibulopathy





labs neg so far





can call my office monday if feet still hurt











Danny Goss MD Jun 22, 2018 07:22

## 2018-06-22 NOTE — HHI.DS
Discharge Summary


Admission Date


Jun 20, 2018 at 14:07


Admitting Diagnosis





Colitis/Nausea/Vomitting/Diarrhea/Vertigo





CBC/BMP:  


6/21/18 1028                                                                   

             6/22/18 0700





Significant Findings





Laboratory Tests








Test


  6/20/18


10:28 6/20/18


20:00 6/21/18


10:28 6/21/18


12:22


 


White Blood Count


  2.4 TH/MM3


(4.0-11.0) 


  


  


 


 


Platelet Count


  111 TH/MM3


(150-450) 


  105 TH/MM3


(150-450) 


 


 


Neutrophils (%) (Auto)


  81.4 %


(16.0-70.0) 


  


  


 


 


Lymphocytes # (Auto)


  0.3 TH/MM3


(1.0-4.8) 


  


  


 


 


Activated Partial


Thromboplast Time 21.8 SEC


(24.3-30.1) 


  


  


 


 


Blood Urea Nitrogen 5 MG/DL (7-18)   6 MG/DL (7-18)  


 


Creatinine


  0.29 MG/DL


(0.50-1.00) 


  0.38 MG/DL


(0.50-1.00) 


 


 


Random Glucose


  125 MG/DL


() 


  


  


 


 


Total Protein


  5.6 GM/DL


(6.4-8.2) 


  


  


 


 


Albumin


  3.1 GM/DL


(3.4-5.0) 


  


  


 


 


Calcium Level


  7.7 MG/DL


(8.5-10.1) 


  


  


 


 


Alkaline Phosphatase


  244 U/L


() 


  


  


 


 


Aspartate Amino Transf


(AST/SGOT) 154 U/L


(15-37) 


  


  


 


 


Alanine Aminotransferase


(ALT/SGPT) 139 U/L


(10-53) 


  


  


 


 


Troponin I


  LESS THAN 0.02


NG/ML 


  


  


 


 


Lipase


  44 U/L


() 


  


  


 


 


Urine Specific Gravity


  


  1.053


(1.002-1.035) 


  


 


 


Urine Urobilinogen


  


  2.0 mg/dL


(LESS THAN 2) 


  


 


 


Potassium Level


  


  


  3.2 MEQ/L


(3.5-5.1) 


 


 


Test


  6/22/18


07:00 


  


  


 


 


Blood Urea Nitrogen 5 MG/DL (7-18)    


 


Creatinine


  0.33 MG/DL


(0.50-1.00) 


  


  


 


 


Calcium Level


  8.1 MG/DL


(8.5-10.1) 


  


  


 


 


Potassium Level


  2.9 MEQ/L


(3.5-5.1) 


  


  


 








Imaging





Last Impressions








Thoracic Spine MRI 6/21/18 0910 Signed





Impressions: 





 CONCLUSION: 





 1.  The cord appears normal throughout.





 2.  Mild disc protrusions at the mid and lower thoracic spine as described lali ball.





  





 





  





 


 


Neck Magnetic Resonance Angiography 6/21/18 0910 Signed





Impressions: 





 CONCLUSION: 





 Negative MRA of the neck.





  





 





 _____________________________________________________________________________





 Percent stenosis is calculated using the diameter of the stenotic region over t





 he diameter of the normal distal internal carotid artery





 _____________________________________________________________________________





  





 





  





 


 


Head Magnetic Resonance Angiography 6/21/18 0910 Signed





Impressions: 





 CONCLUSION: 





 No intracranial arterial vascular abnormality is identified.





  





 





  





 


 


Cervical Spine MRI 6/21/18 0910 Signed





Impressions: 





 CONCLUSION: 





 1.  The cord appears normal throughout.





 2.  Degenerative change in the mid cervical spine.





  





 


 


Brain MRI 6/21/18 0910 Signed





Impressions: 





 CONCLUSION: 





 Negative brain MRI examination.





  





 





  





 


 


Head CT 6/20/18 1019 Signed





Impressions: 





 CONCLUSION:





 No acute intracranial abnormality.





  





 


 


Chest X-Ray 6/20/18 1019 Signed





Impressions: 





 CONCLUSION: 





 1.  No acute abnormality or significant interval change.





  





 


 


Abdomen/Pelvis CT 6/20/18 1019 Signed





Impressions: 





 CONCLUSION:





 1.  On today's examination, there is some nonspecific thickening involving the 





 wall of the cecum, ascending colon, transverse colon and the proximal portion o





 f the descending colon. This is nonspecific but could represent  colitis versus





  a decompressed colon. Recommend correlation with patient's physical, clinical 





 exam and laboratory values.





 2.  Stable fatty infiltration of the liver.





 3.  Stable 2.7 cm right ovarian cyst.





  





 








Hospital Course


Admitted for NV and Diarrhea, colitis. Treated with IVF antiemetics, Seen by 

Neuro for vertigo, work up completed, Symptoms resolved will follow up with 

Neurology.


Potassium 2.9 day of DC, potassium replaced, prior to discharge


New prescription for Lyrica 75mg bid, for neuropathy


Pt Condition on Discharge:  Stable


Discharge Disposition:  Discharge Home


Discharge Instructions


DIET: Follow Instructions for:  As Tolerated, No Restrictions


Activities you can perform:  Regular-No Restrictions


Follow up Referrals:  


Neurology - 1 Week with Danny Goss MD


PCP Follow-up - 1 Week with Jamey Greenberg DO





New Medications:  


Pregabalin (Lyrica) 75 Mg Cap


75 MG PO BID for neuropathy for 30 Days, #60 CAP 0 Refills





 


Continued Medications:  


Diphenoxylate-Atropine (Lomotil) 2.5-0.025 Mg Tab


2 TAB PO Q4HR PRN for DIARRHEA, TAB 0 Refills





Escitalopram (Escitalopram) 10 Mg Tab


10 MG PO DAILY, #30 TAB 0 Refills

















Radha Gabriel Jun 22, 2018 09:17

## 2020-10-14 NOTE — RSPPFT
DATE OF PROCEDURE:   4/13/18



COMMENTS:   



Spirometry shows FVC of 0.9 at 39% of predicted, FEV1 of 0.4 at 21%, FEV1/FVC ratio is 
decreased. Flow is decreased at FEF 25, FEF 50, FEF 75 and FEF 25-75.There is a good 
response after acutely inhaled bronchodilator treatment. Flow volume loop indicates an 
obstructive pattern.  



IMPRESSION:    

   

1.    Severe obstructive lung disease.

2.    Good response after bronchodilator treatment. detailed exam

## 2023-07-26 NOTE — HHI.PR
Subjective


Remarks


Follow up for Ulcerative colitis, possible intra-abdominal abscess. Patient is 

currently doing well. Pain is better. No fever, chills.





Objective


Vitals





Vital Signs








  Date Time  Temp Pulse Resp B/P (MAP) Pulse Ox O2 Delivery O2 Flow Rate FiO2


 


4/7/18 08:00 97.5 72 18 125/56 (79) 95   


 


4/7/18 07:51     95   


 


4/6/18 22:35 97.5 74 20 131/62 (85) 98   


 


4/6/18 20:05 98.5 79 16 102/64 (77) 96 Nasal Cannula 2.00 


 


4/6/18 19:37     96 Nasal Cannula 2.00 


 


4/6/18 15:57   18  94 Nasal Cannula 2.00 


 


4/6/18 15:38  92 18 123/62 (82) 96 Nasal Cannula 3.00 


 


4/6/18 13:43   16     


 


4/6/18 13:26  86 16 113/65 (81) 95 Nasal Cannula 4.00 


 


4/6/18 12:26     95 Nasal Cannula 4.00 


 


4/6/18 12:15   18  81 Nasal Cannula 4.00 














I/O      


 


 4/6/18 4/6/18 4/6/18 4/7/18 4/7/18 4/7/18





 07:00 15:00 23:00 07:00 15:00 23:00


 


Intake Total  1200 ml 100 ml 1400 ml 0 ml 


 


Balance  1200 ml 100 ml 1400 ml 0 ml 


 


      


 


Intake IV Total  1200 ml 100 ml 1400 ml 0 ml 


 


# Voids    3  


 


# Bowel Movements    1  








Result Diagram:  


4/7/18 0547                                                                    

            4/7/18 0606





Imaging





Last Impressions








Chest X-Ray 4/6/18 1119 Signed





Impressions: 





 Service Date/Time:  Friday, April 6, 2018 11:28 - CONCLUSION:  No acute 





 cardiopulmonary process to explain current clinical symptoms.     Malachi Smith MD 


 


Abdomen/Pelvis CT 4/6/18 1119 Signed





Impressions: 





 Service Date/Time:  Friday, April 6, 2018 12:37 - CONCLUSION:  1. Acute on 





 chronic appendicitis suspected in the proper clinical setting; there is an 





 indurated appearing fluid-filled viscus that appears to be blind ending 





 posteromedial to the cecum. No free air seen. 2. Enlarged and severely fatty 





 infiltrated liver. 3. Right ovarian cyst.     Familia Mcgrath MD ADDENDUM:   





 Apparently patient has a normal white count and does not have other clinical 





 features typical for appendicitis. In looking the study over again, there is 





 potentially a portion of a normal appendix seen inferior to the cecum on 

series 





 601 image 68 which would also mitigate against appendicitis. The blind ending 





 pouch posteromedial to the cecum is best nonspecific but I believe abnormal, 





 could be a Meckel's diverticulum. There definitely seems to be some 

inflammatory 





 change of it and the adjacent distal ileum, series 601 image 96. Crohn's 

disease 





 with an adjacent abscess would also be conceivable but unlikely in the setting 





 of a normal white count.   Familia Mcgrath MD 








Objective Remarks


GENERAL: Alert, Oriented x 3, NAD. 


SKIN: Warm and dry.


HEAD: Normocephalic.


EYES: No scleral icterus. No injection or drainage. 


NECK: Supple, trachea midline. No JVD or lymphadenopathy.


CARDIOVASCULAR: Regular rate and rhythm without murmurs, gallops, or rubs. 


RESPIRATORY: Breath sounds equal bilaterally. No accessory muscle use.


GASTROINTESTINAL: Abdomen soft, mild tenderness on palpation, nondistended. 


MUSCULOSKELETAL: No cyanosis, or edema. 


BACK: Nontender without obvious deformity. No CVA tenderness.





Procedures


None.





A/P


Problem List:  


(1) Abdominal pain


ICD Code:  R10.9 - Unspecified abdominal pain


Status:  Acute


(2) Ulcerative colitis


ICD Code:  K51.90 - Ulcerative colitis, unspecified, without complications


Status:  Acute


(3) COPD with acute exacerbation


ICD Code:  J44.1 - Chronic obstructive pulmonary disease with (acute) 

exacerbation


Status:  Acute


Assessment and Plan


This patient is a 53-year-old female with a history of ulcerative colitis who 

was admitted to the hospital due to nausea, vomiting, diarrhea and abdominal 

pain. CT abd/Pelvis was concerning for inflammatory changes around appendix vs. 

abscess. General surgery was consulted. 





Ulcerative colitis


Abdominal pain


Possible intra-abdominal abscess


   Will continue Cipro IV as well as Flagyl IV


   continue Solu-Medrol 40mg Q12hrs. Continue IV fluid 100cc/hour. 


   General surgery evaluated patient - recommended IV abx for possible abscess 

as well as PPI for Mecke's diverticulum


   GI consult pending. 





Hypokalemia


   Improved, K+ 2.7 --> 3.5. 





Full code. SCDs.





Problem Qualifiers





(1) Abdominal pain:  


Qualified Codes:  R10.31 - Right lower quadrant pain


(2) Ulcerative colitis:  


Qualified Codes:  K51.919 - Ulcerative colitis, unspecified with unspecified 

complications








Margoth Zamarripa DO Apr 7, 2018 10:52 am yes